# Patient Record
Sex: FEMALE | Race: WHITE | Employment: UNEMPLOYED | URBAN - METROPOLITAN AREA
[De-identification: names, ages, dates, MRNs, and addresses within clinical notes are randomized per-mention and may not be internally consistent; named-entity substitution may affect disease eponyms.]

---

## 2016-06-23 PROBLEM — H40.013: Noted: 2019-05-17

## 2016-06-23 PROBLEM — H52.223: Noted: 2019-05-17

## 2017-05-17 ENCOUNTER — HOSPITAL ENCOUNTER (EMERGENCY)
Age: 42
Discharge: HOME | End: 2017-05-17
Attending: EMERGENCY MEDICINE
Payer: COMMERCIAL

## 2017-05-17 VITALS
BODY MASS INDEX: 36.8 KG/M2 | RESPIRATION RATE: 20 BRPM | DIASTOLIC BLOOD PRESSURE: 102 MMHG | OXYGEN SATURATION: 100 % | WEIGHT: 200 LBS | HEIGHT: 62 IN | SYSTOLIC BLOOD PRESSURE: 157 MMHG | TEMPERATURE: 97.9 F | HEART RATE: 85 BPM

## 2017-05-17 DIAGNOSIS — K08.89 DENTALGIA: ICD-10-CM

## 2017-05-17 DIAGNOSIS — S93.401A SPRAIN OF RIGHT ANKLE, UNSPECIFIED LIGAMENT, INITIAL ENCOUNTER: Primary | ICD-10-CM

## 2017-05-17 PROCEDURE — 99282 EMERGENCY DEPT VISIT SF MDM: CPT

## 2017-05-17 RX ORDER — ACETAMINOPHEN AND CODEINE PHOSPHATE 300; 30 MG/1; MG/1
1 TABLET ORAL
Qty: 15 TAB | Refills: 0 | Status: SHIPPED | OUTPATIENT
Start: 2017-05-17 | End: 2019-02-17

## 2017-05-17 NOTE — DISCHARGE INSTRUCTIONS
Ankle Sprain: Care Instructions  Your Care Instructions    An ankle sprain can happen when you twist your ankle. The ligaments that support the ankle can get stretched and torn. Often the ankle is swollen and painful. Ankle sprains may take from several weeks to several months to heal. Usually, the more pain and swelling you have, the more severe your ankle sprain is and the longer it will take to heal. You can heal faster and regain strength in your ankle with good home treatment. It is very important to give your ankle time to heal completely, so that you do not easily hurt your ankle again. Follow-up care is a key part of your treatment and safety. Be sure to make and go to all appointments, and call your doctor if you are having problems. It's also a good idea to know your test results and keep a list of the medicines you take. How can you care for yourself at home? · Prop up your foot on pillows as much as possible for the next 3 days. Try to keep your ankle above the level of your heart. This will help reduce the swelling. · Follow your doctor's directions for wearing a splint or elastic bandage. Wrapping the ankle may help reduce or prevent swelling. · Your doctor may give you a splint, a brace, an air stirrup, or another form of ankle support to protect your ankle until it is healed. Wear it as directed while your ankle is healing. Do not remove it unless your doctor tells you to. After your ankle has healed, ask your doctor whether you should wear the brace when you exercise. · Put ice or cold packs on your injured ankle for 10 to 20 minutes at a time. Try to do this every 1 to 2 hours for the next 3 days (when you are awake) or until the swelling goes down. Put a thin cloth between the ice and your skin. · You may need to use crutches until you can walk without pain. If you do use crutches, try to bear some weight on your injured ankle if you can do so without pain.  This helps the ankle heal.  · Take pain medicines exactly as directed. ¨ If the doctor gave you a prescription medicine for pain, take it as prescribed. ¨ If you are not taking a prescription pain medicine, ask your doctor if you can take an over-the-counter medicine. · If you have been given ankle exercises to do at home, do them exactly as instructed. These can promote healing and help prevent lasting weakness. When should you call for help? Call your doctor now or seek immediate medical care if:  · Your pain is getting worse. · Your swelling is getting worse. · Your splint feels too tight or you are unable to loosen it. Watch closely for changes in your health, and be sure to contact your doctor if:  · You are not getting better after 1 week. Where can you learn more? Go to http://booker-alessandra.info/. Enter M070 in the search box to learn more about \"Ankle Sprain: Care Instructions. \"  Current as of: May 23, 2016  Content Version: 11.2  © 8438-4129 [employer]. Care instructions adapted under license by [employer] (which disclaims liability or warranty for this information). If you have questions about a medical condition or this instruction, always ask your healthcare professional. [place] [address] any warranty or liability for your use of this information.

## 2017-05-17 NOTE — ED PROVIDER NOTES
HPI Comments: 3:18 PM Jaret Martin is a 38 y.o. female with a history of Hypertension, Hepatic steatosis, Calculus of Kidney, Gallbladder disease, and GERD who presents to ED to be evaluated for R ankle pain and associated swelling onset four days ago. Pt explains her fiance got a new van and she injured her ankle while getting out of it. Pt has had surgery on the same ankle previously. Pt also c/o lower front dental pain. Pt has taken Tylenol with no relief of any pain. 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 No other concerns at this time. The history is provided by the patient. Past Medical History:   Diagnosis Date    Anxiety     Calculus of kidney     Depression     Gallbladder disease     GERD (gastroesophageal reflux disease)     H/O laparoscopic Malick-en-Y gastric bypass, 7/11/12, BMI 52     Hepatic steatosis     Hypertension     Malabsorption 7/20/2012       Past Surgical History:   Procedure Laterality Date    HX CHOLECYSTECTOMY      HX GASTRIC BYPASS  7/11/12    Laparoscopic Malick-en-Y, BMI 52    HX HYSTERECTOMY  2014    HX ORTHOPAEDIC      ANKLE SURGERY    HX OTHER SURGICAL  7/11/12    Laparoscopic Left Hepatic Lobe Wedge Biopsy    HX TONSILLECTOMY      HX TUBAL LIGATION           Family History:   Problem Relation Age of Onset    Diabetes Mother     Obesity Mother     Cancer Mother      LEUKEMIA    Hypertension Mother     Colon Cancer Maternal Grandmother     Cancer Maternal Grandmother     Obesity Sister     Hypertension Sister        Social History     Social History    Marital status:      Spouse name: N/A    Number of children: N/A    Years of education: N/A     Occupational History    Not on file.      Social History Main Topics    Smoking status: Never Smoker    Smokeless tobacco: Never Used    Alcohol use No      Comment: occ    Drug use: No    Sexual activity: Not on file     Other Topics Concern    Not on file     Social History Narrative         ALLERGIES: Nsaids (non-steroidal anti-inflammatory drug) and Tramadol    Review of Systems   Constitutional: Negative for chills and fever. HENT: Positive for dental problem. Negative for congestion, ear pain and sore throat. Eyes: Negative for pain. Respiratory: Negative for cough and shortness of breath. Cardiovascular: Negative for chest pain. Gastrointestinal: Negative for abdominal pain, diarrhea and vomiting. Genitourinary: Negative for difficulty urinating, dysuria and hematuria. Musculoskeletal: Negative for back pain and neck pain. R ankle pain and swelling. Skin: Negative for rash. Neurological: Negative for light-headedness. All other systems reviewed and are negative. Vitals:    05/17/17 1518   BP: (!) 157/102   Pulse: 85   Resp: 20   Temp: 97.9 °F (36.6 °C)   SpO2: 100%   Weight: 90.7 kg (200 lb)   Height: 5' 2\" (1.575 m)            Physical Exam   Constitutional: She appears well-developed and well-nourished. Non-toxic appearance. She does not have a sickly appearance. She does not appear ill. No distress. HENT:   Head: Normocephalic and atraumatic. Mouth/Throat: Oropharynx is clear and moist and mucous membranes are normal. She does not have dentures. No oral lesions. No trismus in the jaw. Abnormal dentition. Dental caries present. No dental abscesses, uvula swelling or lacerations. No oropharyngeal exudate, posterior oropharyngeal edema, posterior oropharyngeal erythema or tonsillar abscesses. 4 teeth lower 23-26    26 mild caries    Eyes: Conjunctivae and EOM are normal. Pupils are equal, round, and reactive to light. No scleral icterus. Neck: Normal range of motion. Neck supple. No hepatojugular reflux and no JVD present. No tracheal deviation present. No thyromegaly present.    Cardiovascular: Normal rate, regular rhythm, S1 normal, S2 normal, normal heart sounds, intact distal pulses and normal pulses. Exam reveals no gallop, no S3 and no S4. No murmur heard. Pulses:       Radial pulses are 2+ on the right side, and 2+ on the left side. Dorsalis pedis pulses are 2+ on the right side, and 2+ on the left side. Pulmonary/Chest: Effort normal and breath sounds normal. No respiratory distress. She has no decreased breath sounds. She has no wheezes. She has no rhonchi. She has no rales. Abdominal: Soft. Normal appearance and bowel sounds are normal. She exhibits no distension and no mass. There is no hepatosplenomegaly. There is no tenderness. There is no rigidity, no rebound, no guarding, no CVA tenderness, no tenderness at McBurney's point and negative Ho's sign. Musculoskeletal: Normal range of motion. Lymphadenopathy:        Head (right side): No submental, no submandibular, no preauricular and no occipital adenopathy present. Head (left side): No submental, no submandibular, no preauricular and no occipital adenopathy present. She has no cervical adenopathy. Right: No supraclavicular adenopathy present. Left: No supraclavicular adenopathy present. Neurological: She is alert. She has normal strength and normal reflexes. She is not disoriented. No cranial nerve deficit or sensory deficit. Coordination and gait normal. GCS eye subscore is 4. GCS verbal subscore is 5. GCS motor subscore is 6. Grossly intact    Skin: Skin is warm, dry and intact. No rash noted. She is not diaphoretic. Psychiatric: She has a normal mood and affect. Her speech is normal and behavior is normal. Judgment and thought content normal. Cognition and memory are normal.   Nursing note and vitals reviewed. MDM  Number of Diagnoses or Management Options  Dentalgia:   Sprain of right ankle, unspecified ligament, initial encounter:     ED Course       Procedures    I have assessed the patient. Patient is feeling better.   Patient will be prescribed Tylenol 3.  Patient was discharged in stable condition. Patient is to return to emergency department if any new or worsening condition. 3:24 PM    Disposition: Discharged    Diagnosis:   1. Sprain of right ankle, unspecified ligament, initial encounter    2. Dentalgia          Follow-up Information     Follow up With Details Comments MD Lui In 2 days Follow up [id number] [address], [facility] Jaxon, am scribing for, and in the presence of [facility],  found 3:16 PM, 05/17/17. Physician Attestation  I personally performed the services described in the documentation, reviewed the documentation, as recorded by the scribe in my presence, and it accurately and completely records my words and actions.     [facility],   Signed by: Brooke Cain, May 17, 2017 at 3:17 PM

## 2017-07-22 ENCOUNTER — APPOINTMENT (OUTPATIENT)
Dept: CT IMAGING | Age: 42
End: 2017-07-22
Attending: PHYSICIAN ASSISTANT
Payer: MEDICAID

## 2017-07-22 ENCOUNTER — HOSPITAL ENCOUNTER (EMERGENCY)
Age: 42
Discharge: HOME | End: 2017-07-22
Attending: EMERGENCY MEDICINE
Payer: MEDICAID

## 2017-07-22 VITALS
OXYGEN SATURATION: 99 % | HEIGHT: 62 IN | DIASTOLIC BLOOD PRESSURE: 83 MMHG | HEART RATE: 68 BPM | WEIGHT: 200 LBS | RESPIRATION RATE: 17 BRPM | TEMPERATURE: 98.3 F | BODY MASS INDEX: 36.8 KG/M2 | SYSTOLIC BLOOD PRESSURE: 141 MMHG

## 2017-07-22 DIAGNOSIS — R10.84 ABDOMINAL PAIN, GENERALIZED: Primary | ICD-10-CM

## 2017-07-22 LAB
ALBUMIN SERPL BCP-MCNC: 3.9 G/DL (ref 3.4–5)
ALBUMIN/GLOB SERPL: 1.1 {RATIO} (ref 0.8–1.7)
ALP SERPL-CCNC: 89 U/L (ref 45–117)
ALT SERPL-CCNC: 18 U/L (ref 13–56)
ANION GAP BLD CALC-SCNC: 6 MMOL/L (ref 3–18)
APPEARANCE UR: CLEAR
AST SERPL W P-5'-P-CCNC: 12 U/L (ref 15–37)
BASOPHILS # BLD AUTO: 0 K/UL (ref 0–0.06)
BASOPHILS # BLD: 0 % (ref 0–2)
BILIRUB DIRECT SERPL-MCNC: 0.1 MG/DL (ref 0–0.2)
BILIRUB SERPL-MCNC: 0.4 MG/DL (ref 0.2–1)
BILIRUB UR QL: NEGATIVE
BUN SERPL-MCNC: 8 MG/DL (ref 7–18)
BUN/CREAT SERPL: 9 (ref 12–20)
CALCIUM SERPL-MCNC: 9 MG/DL (ref 8.5–10.1)
CHLORIDE SERPL-SCNC: 110 MMOL/L (ref 100–108)
CO2 SERPL-SCNC: 26 MMOL/L (ref 21–32)
COLOR UR: YELLOW
CREAT SERPL-MCNC: 0.87 MG/DL (ref 0.6–1.3)
DIFFERENTIAL METHOD BLD: ABNORMAL
EOSINOPHIL # BLD: 0 K/UL (ref 0–0.4)
EOSINOPHIL NFR BLD: 1 % (ref 0–5)
ERYTHROCYTE [DISTWIDTH] IN BLOOD BY AUTOMATED COUNT: 13.6 % (ref 11.6–14.5)
GLOBULIN SER CALC-MCNC: 3.4 G/DL (ref 2–4)
GLUCOSE SERPL-MCNC: 98 MG/DL (ref 74–99)
GLUCOSE UR STRIP.AUTO-MCNC: NEGATIVE MG/DL
HCT VFR BLD AUTO: 44.8 % (ref 35–45)
HGB BLD-MCNC: 14.8 G/DL (ref 12–16)
HGB UR QL STRIP: NEGATIVE
KETONES UR QL STRIP.AUTO: ABNORMAL MG/DL
LEUKOCYTE ESTERASE UR QL STRIP.AUTO: NEGATIVE
LIPASE SERPL-CCNC: 166 U/L (ref 73–393)
LYMPHOCYTES # BLD AUTO: 19 % (ref 21–52)
LYMPHOCYTES # BLD: 1.2 K/UL (ref 0.9–3.6)
MCH RBC QN AUTO: 31.8 PG (ref 24–34)
MCHC RBC AUTO-ENTMCNC: 33 G/DL (ref 31–37)
MCV RBC AUTO: 96.1 FL (ref 74–97)
MONOCYTES # BLD: 0.4 K/UL (ref 0.05–1.2)
MONOCYTES NFR BLD AUTO: 5 % (ref 3–10)
NEUTS SEG # BLD: 4.8 K/UL (ref 1.8–8)
NEUTS SEG NFR BLD AUTO: 75 % (ref 40–73)
NITRITE UR QL STRIP.AUTO: NEGATIVE
PH UR STRIP: >8.5 [PH] (ref 5–8)
PLATELET # BLD AUTO: 239 K/UL (ref 135–420)
PMV BLD AUTO: 11.1 FL (ref 9.2–11.8)
POTASSIUM SERPL-SCNC: 3.6 MMOL/L (ref 3.5–5.5)
PROT SERPL-MCNC: 7.3 G/DL (ref 6.4–8.2)
PROT UR STRIP-MCNC: NEGATIVE MG/DL
RBC # BLD AUTO: 4.66 M/UL (ref 4.2–5.3)
SODIUM SERPL-SCNC: 142 MMOL/L (ref 136–145)
SP GR UR REFRACTOMETRY: 1.02 (ref 1–1.03)
UROBILINOGEN UR QL STRIP.AUTO: 1 EU/DL (ref 0.2–1)
WBC # BLD AUTO: 6.5 K/UL (ref 4.6–13.2)

## 2017-07-22 PROCEDURE — 80048 BASIC METABOLIC PNL TOTAL CA: CPT | Performed by: PHYSICIAN ASSISTANT

## 2017-07-22 PROCEDURE — 81003 URINALYSIS AUTO W/O SCOPE: CPT | Performed by: PHYSICIAN ASSISTANT

## 2017-07-22 PROCEDURE — 85025 COMPLETE CBC W/AUTO DIFF WBC: CPT | Performed by: PHYSICIAN ASSISTANT

## 2017-07-22 PROCEDURE — 80076 HEPATIC FUNCTION PANEL: CPT | Performed by: PHYSICIAN ASSISTANT

## 2017-07-22 PROCEDURE — 83690 ASSAY OF LIPASE: CPT | Performed by: PHYSICIAN ASSISTANT

## 2017-07-22 PROCEDURE — 74011250636 HC RX REV CODE- 250/636: Performed by: PHYSICIAN ASSISTANT

## 2017-07-22 PROCEDURE — 74177 CT ABD & PELVIS W/CONTRAST: CPT

## 2017-07-22 PROCEDURE — 74011636320 HC RX REV CODE- 636/320: Performed by: EMERGENCY MEDICINE

## 2017-07-22 PROCEDURE — 99283 EMERGENCY DEPT VISIT LOW MDM: CPT

## 2017-07-22 PROCEDURE — 96376 TX/PRO/DX INJ SAME DRUG ADON: CPT

## 2017-07-22 PROCEDURE — 96374 THER/PROPH/DIAG INJ IV PUSH: CPT

## 2017-07-22 RX ORDER — HYDROMORPHONE HYDROCHLORIDE 1 MG/ML
0.5 INJECTION, SOLUTION INTRAMUSCULAR; INTRAVENOUS; SUBCUTANEOUS
Status: COMPLETED | OUTPATIENT
Start: 2017-07-22 | End: 2017-07-22

## 2017-07-22 RX ORDER — OXYCODONE AND ACETAMINOPHEN 5; 325 MG/1; MG/1
1 TABLET ORAL
Qty: 10 TAB | Refills: 0 | Status: SHIPPED | OUTPATIENT
Start: 2017-07-22 | End: 2018-02-14

## 2017-07-22 RX ORDER — HYDROMORPHONE HYDROCHLORIDE 1 MG/ML
1 INJECTION, SOLUTION INTRAMUSCULAR; INTRAVENOUS; SUBCUTANEOUS ONCE
Status: COMPLETED | OUTPATIENT
Start: 2017-07-22 | End: 2017-07-22

## 2017-07-22 RX ORDER — ONDANSETRON 4 MG/1
8 TABLET, FILM COATED ORAL
Qty: 12 TAB | Refills: 0 | Status: SHIPPED | OUTPATIENT
Start: 2017-07-22

## 2017-07-22 RX ADMIN — HYDROMORPHONE HYDROCHLORIDE 1 MG: 1 INJECTION, SOLUTION INTRAMUSCULAR; INTRAVENOUS; SUBCUTANEOUS at 13:20

## 2017-07-22 RX ADMIN — HYDROMORPHONE HYDROCHLORIDE 0.5 MG: 1 INJECTION, SOLUTION INTRAMUSCULAR; INTRAVENOUS; SUBCUTANEOUS at 14:54

## 2017-07-22 RX ADMIN — IOPAMIDOL 100 ML: 612 INJECTION, SOLUTION INTRAVENOUS at 14:30

## 2017-07-22 NOTE — ED NOTES
Discussed with the patient and all questioned fully answered. She will call me if any problems arise. Discharge medications reviewed with patient and appropriate educational materials and side effects teaching were provided. IV removed. Pt advised to return if symptoms get worse.

## 2017-07-22 NOTE — ED TRIAGE NOTES
Patient C/O right lower quadrant abdominal pain and nausea starting this morning, denies chest pain and shortness of breath.

## 2017-07-22 NOTE — DISCHARGE INSTRUCTIONS

## 2017-07-22 NOTE — ED NOTES
Discussed plan of care with patient at this time. Questions encouraged and addressed. Call light within reach.

## 2017-07-22 NOTE — ED NOTES
Patient resting comfortably on stretcher. Denies need for anything at this time. Call light within reach.

## 2017-07-22 NOTE — ED PROVIDER NOTES
HPI Comments: 37yo female presenting to ED with abdominal pain x1 day. Pt reports pain in lower abdomen, R>L but radiating to generalized abdomen region. Pt c/o nausea with pain, denies vomiting, fever, chills, urinary symptoms, CP, SOB, vaginal complaints, back pain o any other symptos. Past Medical History:  No date: Anxiety  No date: Calculus of kidney  No date: Depression  No date: Gallbladder disease  No date: GERD (gastroesophageal reflux disease)  No date: H/O laparoscopic Malick-en-Y gastric bypass, 7/1*  No date: Hepatic steatosis  No date: Hypertension  7/20/2012: Malabsorption   Past Surgical History:  No date: HX CHOLECYSTECTOMY  7/11/12: HX GASTRIC BYPASS      Comment: Laparoscopic Malick-en-Y, BMI 47  2014: HX HYSTERECTOMY  No date: HX ORTHOPAEDIC      Comment: ANKLE SURGERY  7/11/12: HX OTHER SURGICAL      Comment: Laparoscopic Left Hepatic Lobe Wedge Biopsy  No date: HX TONSILLECTOMY  No date: HX TUBAL LIGATION      Patient is a 38 y.o. female presenting with abdominal pain and nausea. The history is provided by the patient. Abdominal Pain    This is a new problem. Episode onset: woke up with pain. The problem occurs constantly. The problem has not changed since onset. The pain is located in the RLQ. The quality of the pain is sharp and throbbing. Associated symptoms include nausea. Nothing worsens the pain. The pain is relieved by nothing. Nausea    Associated symptoms include abdominal pain.         Past Medical History:   Diagnosis Date    Anxiety     Calculus of kidney     Depression     Gallbladder disease     GERD (gastroesophageal reflux disease)     H/O laparoscopic Malick-en-Y gastric bypass, 7/11/12, BMI 47     Hepatic steatosis     Hypertension     Malabsorption 7/20/2012       Past Surgical History:   Procedure Laterality Date    HX CHOLECYSTECTOMY      HX GASTRIC BYPASS  7/11/12    Laparoscopic Malick-en-Y, BMI 47    HX HYSTERECTOMY  2014    HX ORTHOPAEDIC      ANKLE SURGERY  HX OTHER SURGICAL  7/11/12    Laparoscopic Left Hepatic Lobe Wedge Biopsy    HX TONSILLECTOMY      HX TUBAL LIGATION           Family History:   Problem Relation Age of Onset    Diabetes Mother     Obesity Mother     Cancer Mother      LEUKEMIA    Hypertension Mother     Colon Cancer Maternal Grandmother     Cancer Maternal Grandmother     Obesity Sister     Hypertension Sister        Social History     Social History    Marital status:      Spouse name: N/A    Number of children: N/A    Years of education: N/A     Occupational History    Not on file. Social History Main Topics    Smoking status: Never Smoker    Smokeless tobacco: Never Used    Alcohol use No      Comment: occ    Drug use: No    Sexual activity: Not on file     Other Topics Concern    Not on file     Social History Narrative         ALLERGIES: Nsaids (non-steroidal anti-inflammatory drug) and Tramadol    Review of Systems   Gastrointestinal: Positive for abdominal pain and nausea. Vitals:    07/22/17 1210   BP: (!) 148/98   Pulse: 90   Resp: 16   Temp: 98.3 °F (36.8 °C)   SpO2: 100%   Weight: 90.7 kg (200 lb)   Height: 5' 2\" (1.575 m)            Physical Exam   Constitutional: She is oriented to person, place, and time. She appears well-developed and well-nourished. No distress. HENT:   Head: Normocephalic and atraumatic. Mouth/Throat: Oropharynx is clear and moist.   Eyes: Conjunctivae are normal.   Neck: Normal range of motion. Neck supple. Cardiovascular: Normal rate, regular rhythm and normal heart sounds. Pulmonary/Chest: Effort normal and breath sounds normal. No respiratory distress. She has no wheezes. She exhibits no tenderness. Abdominal: Soft. Bowel sounds are normal. She exhibits no distension and no mass. There is tenderness. There is no rebound and no guarding. Musculoskeletal: Normal range of motion. Neurological: She is alert and oriented to person, place, and time.  No cranial nerve deficit. Coordination normal.   Skin: Skin is warm and dry. No rash noted. She is not diaphoretic. Psychiatric: She has a normal mood and affect. Her behavior is normal.   Nursing note and vitals reviewed. MDM  Number of Diagnoses or Management Options  Diagnosis management comments: Pt presents ambulatory in NAD, well-hydrated, non-toxic in appearance, with mildly elevated HR and otherwise normal vitals. Neg CP, fever, chills, sob, vaginal or urinary symptoms. Benign exam of abdomen with minimal epigastric and RLQ TTP and no peritoneal signs. Pt is deferring pelvic exam.  Unremarkable labs. Hx of gastric bypass. CT abd wcontract unremarkable. No appendicitis, diverticulitis, obstruction. Tolerating PO. No vomiting inED. Abdomen reassessed and soft. No pain at present. Pt appears well, comfortable. D/w patient- she wants to go home and will f/u with PCP and/or return immediately with sx discussed including fever, vomiting, etc.   Will DC home with pain meds and zofran.            Amount and/or Complexity of Data Reviewed  Clinical lab tests: ordered and reviewed  Tests in the radiology section of CPT®: ordered and reviewed      ED Course       Procedures

## 2017-10-23 ENCOUNTER — APPOINTMENT (OUTPATIENT)
Dept: GENERAL RADIOLOGY | Age: 42
End: 2017-10-23
Attending: EMERGENCY MEDICINE
Payer: MEDICAID

## 2017-10-23 ENCOUNTER — HOSPITAL ENCOUNTER (EMERGENCY)
Age: 42
Discharge: HOME | End: 2017-10-23
Attending: EMERGENCY MEDICINE | Admitting: EMERGENCY MEDICINE
Payer: MEDICAID

## 2017-10-23 VITALS
WEIGHT: 200 LBS | RESPIRATION RATE: 20 BRPM | OXYGEN SATURATION: 99 % | BODY MASS INDEX: 36.8 KG/M2 | SYSTOLIC BLOOD PRESSURE: 127 MMHG | HEART RATE: 87 BPM | DIASTOLIC BLOOD PRESSURE: 94 MMHG | HEIGHT: 62 IN | TEMPERATURE: 98.5 F

## 2017-10-23 DIAGNOSIS — S92.405A CLOSED NONDISPLACED FRACTURE OF PHALANX OF LEFT GREAT TOE, UNSPECIFIED PHALANX, INITIAL ENCOUNTER: Primary | ICD-10-CM

## 2017-10-23 PROCEDURE — 99283 EMERGENCY DEPT VISIT LOW MDM: CPT

## 2017-10-23 PROCEDURE — 73562 X-RAY EXAM OF KNEE 3: CPT

## 2017-10-23 PROCEDURE — 73564 X-RAY EXAM KNEE 4 OR MORE: CPT

## 2017-10-23 PROCEDURE — 73660 X-RAY EXAM OF TOE(S): CPT

## 2017-10-23 PROCEDURE — 73600 X-RAY EXAM OF ANKLE: CPT

## 2017-10-23 PROCEDURE — 74011250637 HC RX REV CODE- 250/637: Performed by: EMERGENCY MEDICINE

## 2017-10-23 PROCEDURE — L1902 AFO ANKLE GAUNTLET PRE OTS: HCPCS

## 2017-10-23 RX ORDER — HYDROCODONE BITARTRATE AND ACETAMINOPHEN 5; 325 MG/1; MG/1
TABLET ORAL
Qty: 12 TAB | Refills: 0 | Status: SHIPPED | OUTPATIENT
Start: 2017-10-23 | End: 2018-05-03

## 2017-10-23 RX ORDER — HYDROCODONE BITARTRATE AND ACETAMINOPHEN 5; 325 MG/1; MG/1
1 TABLET ORAL
Status: COMPLETED | OUTPATIENT
Start: 2017-10-23 | End: 2017-10-23

## 2017-10-23 RX ADMIN — HYDROCODONE BITARTRATE AND ACETAMINOPHEN 1 TABLET: 5; 325 TABLET ORAL at 11:54

## 2017-10-23 NOTE — ED PROVIDER NOTES
HPI Comments: 10:40 AM Diane Bloom is a 38 y.o. female with h/o HTN who presents to ED complaining of constant right ankle onset yesterday. Pt states was steam cleaning a floor and slipped when she moved from carpet to linoleum and twisted her right ankle and \"jammed\" her left great toe. She also has pain in her left knee, left hip, and left arm. Pt can ambulate but states it is painful to do so. Pt states she took 6 Tylenol yesterday with no relief and 3 Tylenol this morning with no relief. Pt admits to having arthritic issues with left knee. Past surgical hx includes gastric bypass surgery (states not had HTN since the surgery) and fracturing right ankle twice. Pt states she is unable to take NSAIDs Pt denies DM, tobacco use, ETOH use, drug use. Denies head injury. No other concerns or symptoms at this time. PCP: Wei aSnz MD      The history is provided by the patient.         Past Medical History:   Diagnosis Date    Anxiety     Calculus of kidney     Depression     Gallbladder disease     GERD (gastroesophageal reflux disease)     H/O laparoscopic Malick-en-Y gastric bypass, 7/11/12, BMI 52     Hepatic steatosis     Hypertension     Malabsorption 7/20/2012       Past Surgical History:   Procedure Laterality Date    HX CHOLECYSTECTOMY      HX GASTRIC BYPASS  7/11/12    Laparoscopic Malick-en-Y, BMI 52    HX HYSTERECTOMY  2014    HX ORTHOPAEDIC      ANKLE SURGERY    HX OTHER SURGICAL  7/11/12    Laparoscopic Left Hepatic Lobe Wedge Biopsy    HX TONSILLECTOMY      HX TUBAL LIGATION           Family History:   Problem Relation Age of Onset    Diabetes Mother     Obesity Mother     Cancer Mother      LEUKEMIA    Hypertension Mother     Colon Cancer Maternal Grandmother     Cancer Maternal Grandmother     Obesity Sister     Hypertension Sister        Social History     Social History    Marital status:      Spouse name: N/A    Number of children: N/A    Years of education: N/A     Occupational History    Not on file. Social History Main Topics    Smoking status: Never Smoker    Smokeless tobacco: Never Used    Alcohol use No      Comment: occ    Drug use: No    Sexual activity: Not on file     Other Topics Concern    Not on file     Social History Narrative         ALLERGIES: Nsaids (non-steroidal anti-inflammatory drug) and Tramadol    Review of Systems   Constitutional: Negative. Negative for chills, diaphoresis and fever. HENT: Negative. Negative for congestion, rhinorrhea and sore throat. Eyes: Negative. Negative for pain, discharge and redness. Respiratory: Negative. Negative for cough, chest tightness, shortness of breath and wheezing. Cardiovascular: Negative. Negative for chest pain. Gastrointestinal: Negative. Negative for abdominal pain, constipation, diarrhea, nausea and vomiting. Genitourinary: Negative. Negative for dysuria, flank pain, frequency, hematuria and urgency. Musculoskeletal: Negative. Negative for back pain and neck pain. Positive right ankle pain  Positive left great toe pain  Positive left knee pain  Positive left hip pain    Skin: Negative. Negative for rash. Neurological: Negative. Negative for syncope, weakness, numbness and headaches. Psychiatric/Behavioral: Negative. All other systems reviewed and are negative. Vitals:    10/23/17 1030   BP: (!) 127/94   Pulse: 87   Resp: 20   Temp: 98.5 °F (36.9 °C)   SpO2: 99%   Weight: 90.7 kg (200 lb)   Height: 5' 2\" (1.575 m)            Physical Exam   Constitutional: She appears well-developed and well-nourished. Non-toxic appearance. She does not have a sickly appearance. She does not appear ill. No distress. HENT:   Head: Normocephalic and atraumatic. Mouth/Throat: Oropharynx is clear and moist. No oropharyngeal exudate. Eyes: Conjunctivae and EOM are normal. Pupils are equal, round, and reactive to light. No scleral icterus.    Neck: Trachea normal and normal range of motion. Neck supple. No hepatojugular reflux and no JVD present. No tracheal deviation present. No thyromegaly present. Cardiovascular: Normal rate, regular rhythm, S1 normal, S2 normal, normal heart sounds, intact distal pulses and normal pulses. Exam reveals no gallop, no S3 and no S4. No murmur heard. Pulses:       Radial pulses are 2+ on the right side, and 2+ on the left side. Dorsalis pedis pulses are 2+ on the right side, and 2+ on the left side. Pulmonary/Chest: Effort normal and breath sounds normal. No accessory muscle usage. No respiratory distress. She has no decreased breath sounds. She has no wheezes. She has no rhonchi. She has no rales. Abdominal: Soft. Normal appearance and bowel sounds are normal. She exhibits no distension and no mass. There is no hepatosplenomegaly. There is no tenderness. There is no rigidity, no rebound, no guarding, no CVA tenderness, no tenderness at McBurney's point and negative Ho's sign. Musculoskeletal: Normal range of motion. Left knee: She exhibits normal range of motion, no swelling, no effusion, no ecchymosis, no deformity, no laceration, no erythema, normal alignment, no LCL laxity, normal patellar mobility, no bony tenderness, normal meniscus and no MCL laxity. Tenderness found. No patellar tendon tenderness noted. Right ankle: She exhibits normal range of motion, no swelling, no ecchymosis, no deformity, no laceration and normal pulse. Tenderness. Lateral malleolus tenderness found. No medial malleolus, no AITFL, no CF ligament, no posterior TFL, no head of 5th metatarsal and no proximal fibula tenderness found. Achilles tendon normal.        Legs:       Feet:    Lymphadenopathy:        Head (right side): No submental, no submandibular, no preauricular and no occipital adenopathy present.         Head (left side): No submental, no submandibular, no preauricular and no occipital adenopathy present. She has no cervical adenopathy. Right: No supraclavicular adenopathy present. Left: No supraclavicular adenopathy present. Neurological: She is alert. She has normal strength and normal reflexes. She is not disoriented. No cranial nerve deficit or sensory deficit. Coordination and gait normal. GCS eye subscore is 4. GCS verbal subscore is 5. GCS motor subscore is 6. Grossly intact    Skin: Skin is warm, dry and intact. No rash noted. She is not diaphoretic. Psychiatric: She has a normal mood and affect. Her speech is normal and behavior is normal. Judgment and thought content normal. Cognition and memory are normal.   Nursing note and vitals reviewed. MDM  Number of Diagnoses or Management Options  Closed nondisplaced fracture of phalanx of left great toe, unspecified phalanx, initial encounter:   Diagnosis management comments: Fall  Contusion   Fracture     ED Course       Procedures    Left great toe X-Ray showed fracture. Left knee X-Ray showed No acute process. Right ankle X-Ray showed No acute process   11:26 AM 10/23/2017     I have reassessed the patient. Patient is feeling better. Patient will be prescribed Norco.  Patient was discharged in stable condition. Patient is to return to emergency department if any new or worsening condition. Scribe Attestation      Jenni Red and Jammin Java acting as a scribe for and in the presence of Nubia Ray DO      October 23, 2017 at 11:00 AM       Provider Attestation:      I personally performed the services described in the documentation, reviewed the documentation, as recorded by the scribe in my presence, and it accurately and completely records my words and actions.  October 23, 2017 at 11:00 AM - Nubia Ray DO

## 2017-10-23 NOTE — DISCHARGE INSTRUCTIONS
Broken Toe: Care Instructions  Your Care Instructions  You have broken (fractured) a bone in your toe. This kind of fracture does not need a special cast or brace. \"Tariq-taping\" your broken toe to a healthy toe next to it is almost always enough to treat the problem and ease symptoms. The toe may take 4 weeks or more to heal.  You heal best when you take good care of yourself. Eat a variety of healthy foods, and don't smoke. Follow-up care is a key part of your treatment and safety. Be sure to make and go to all appointments, and call your doctor if you are having problems. It's also a good idea to know your test results and keep a list of the medicines you take. How can you care for yourself at home? · Be safe with medicines. Take pain medicines exactly as directed. ¨ If the doctor gave you a prescription medicine for pain, take it as prescribed. ¨ If you are not taking a prescription pain medicine, ask your doctor if you can take an over-the-counter medicine. · If your toe is taped to the toe next to it, your doctor has shown you how to change the tape. Protect the skin by putting something soft, such as felt or foam, between your toes before you tape them together. Never tape the toes together skin-to-skin. Your broken toe may need to be tariq-taped for 2 to 4 weeks to heal.  · Rest and protect your toe. Do not walk on it until you can do so without too much pain. If the doctor has told you to use crutches, use them as instructed. · Put ice or a cold pack on your toe for 10 to 20 minutes at a time. Try to do this every 1 to 2 hours for the next 3 days (when you are awake) or until the swelling goes down. Put a thin cloth between the ice and your skin. · Prop up your foot on a pillow when you ice it or anytime you sit or lie down. Try to keep it above the level of your heart. This will help reduce swelling. · Make sure you go to your follow-up appointments.  Your doctor will need to check that your toe is healing right. When should you call for help? Call your doctor now or seek immediate medical care if:  · You have severe pain. · Your toe is cool or pale or changes color. · You have tingling, weakness, or numbness in your toe. Watch closely for changes in your health, and be sure to contact your doctor if:  · Pain and swelling get worse or are not improving. · You have a new or worse deformity in your toe. Where can you learn more? Go to http://booker-alessandra.info/. Enter B514 in the search box to learn more about \"Broken Toe: Care Instructions. \"  Current as of: October 19, 2016  Content Version: 11.3  © 4412-0632 [employer]. Care instructions adapted under license by [employer] (which disclaims liability or warranty for this information). If you have questions about a medical condition or this instruction, always ask your healthcare professional. [address] any warranty or liability for your use of this information.

## 2018-02-14 ENCOUNTER — HOSPITAL ENCOUNTER (EMERGENCY)
Age: 43
Discharge: HOME | End: 2018-02-14
Attending: EMERGENCY MEDICINE
Payer: MEDICAID

## 2018-02-14 ENCOUNTER — APPOINTMENT (OUTPATIENT)
Dept: CT IMAGING | Age: 43
End: 2018-02-14
Attending: NURSE PRACTITIONER
Payer: MEDICAID

## 2018-02-14 VITALS
TEMPERATURE: 97.8 F | DIASTOLIC BLOOD PRESSURE: 99 MMHG | SYSTOLIC BLOOD PRESSURE: 144 MMHG | HEIGHT: 62 IN | HEART RATE: 81 BPM | WEIGHT: 205 LBS | OXYGEN SATURATION: 97 % | BODY MASS INDEX: 37.73 KG/M2 | RESPIRATION RATE: 16 BRPM

## 2018-02-14 DIAGNOSIS — R10.31 ABDOMINAL PAIN, RIGHT LOWER QUADRANT: Primary | ICD-10-CM

## 2018-02-14 LAB
ALBUMIN SERPL-MCNC: 3.8 G/DL (ref 3.4–5)
ALBUMIN/GLOB SERPL: 1.1 {RATIO} (ref 0.8–1.7)
ALP SERPL-CCNC: 118 U/L (ref 45–117)
ALT SERPL-CCNC: 22 U/L (ref 13–56)
ANION GAP SERPL CALC-SCNC: 8 MMOL/L (ref 3–18)
APPEARANCE UR: CLEAR
AST SERPL-CCNC: 18 U/L (ref 15–37)
BACTERIA URNS QL MICRO: NEGATIVE /HPF
BASOPHILS # BLD: 0 K/UL (ref 0–0.06)
BASOPHILS NFR BLD: 1 % (ref 0–2)
BILIRUB DIRECT SERPL-MCNC: 0.2 MG/DL (ref 0–0.2)
BILIRUB SERPL-MCNC: 0.4 MG/DL (ref 0.2–1)
BILIRUB UR QL: NEGATIVE
BUN SERPL-MCNC: 10 MG/DL (ref 7–18)
BUN/CREAT SERPL: 11 (ref 12–20)
CALCIUM SERPL-MCNC: 9.1 MG/DL (ref 8.5–10.1)
CHLORIDE SERPL-SCNC: 101 MMOL/L (ref 100–108)
CO2 SERPL-SCNC: 30 MMOL/L (ref 21–32)
COLOR UR: YELLOW
CREAT SERPL-MCNC: 0.88 MG/DL (ref 0.6–1.3)
DIFFERENTIAL METHOD BLD: ABNORMAL
EOSINOPHIL # BLD: 0.1 K/UL (ref 0–0.4)
EOSINOPHIL NFR BLD: 2 % (ref 0–5)
EPITH CASTS URNS QL MICRO: NORMAL /LPF (ref 0–5)
ERYTHROCYTE [DISTWIDTH] IN BLOOD BY AUTOMATED COUNT: 12.2 % (ref 11.6–14.5)
GLOBULIN SER CALC-MCNC: 3.4 G/DL (ref 2–4)
GLUCOSE SERPL-MCNC: 97 MG/DL (ref 74–99)
GLUCOSE UR STRIP.AUTO-MCNC: NEGATIVE MG/DL
HCT VFR BLD AUTO: 45.3 % (ref 35–45)
HGB BLD-MCNC: 15.2 G/DL (ref 12–16)
HGB UR QL STRIP: ABNORMAL
KETONES UR QL STRIP.AUTO: ABNORMAL MG/DL
LEUKOCYTE ESTERASE UR QL STRIP.AUTO: NEGATIVE
LYMPHOCYTES # BLD: 2 K/UL (ref 0.9–3.6)
LYMPHOCYTES NFR BLD: 35 % (ref 21–52)
MCH RBC QN AUTO: 32.1 PG (ref 24–34)
MCHC RBC AUTO-ENTMCNC: 33.6 G/DL (ref 31–37)
MCV RBC AUTO: 95.8 FL (ref 74–97)
MONOCYTES # BLD: 0.4 K/UL (ref 0.05–1.2)
MONOCYTES NFR BLD: 6 % (ref 3–10)
NEUTS SEG # BLD: 3.2 K/UL (ref 1.8–8)
NEUTS SEG NFR BLD: 56 % (ref 40–73)
NITRITE UR QL STRIP.AUTO: NEGATIVE
PH UR STRIP: 5 [PH] (ref 5–8)
PLATELET # BLD AUTO: 239 K/UL (ref 135–420)
PMV BLD AUTO: 10.8 FL (ref 9.2–11.8)
POTASSIUM SERPL-SCNC: 3.9 MMOL/L (ref 3.5–5.5)
PROT SERPL-MCNC: 7.2 G/DL (ref 6.4–8.2)
PROT UR STRIP-MCNC: NEGATIVE MG/DL
RBC # BLD AUTO: 4.73 M/UL (ref 4.2–5.3)
RBC #/AREA URNS HPF: NORMAL /HPF (ref 0–5)
SODIUM SERPL-SCNC: 139 MMOL/L (ref 136–145)
SP GR UR REFRACTOMETRY: 1.02 (ref 1–1.03)
UROBILINOGEN UR QL STRIP.AUTO: 0.2 EU/DL (ref 0.2–1)
WBC # BLD AUTO: 5.8 K/UL (ref 4.6–13.2)
WBC URNS QL MICRO: NORMAL /HPF (ref 0–4)

## 2018-02-14 PROCEDURE — 99283 EMERGENCY DEPT VISIT LOW MDM: CPT

## 2018-02-14 PROCEDURE — 74011250636 HC RX REV CODE- 250/636: Performed by: EMERGENCY MEDICINE

## 2018-02-14 PROCEDURE — 96374 THER/PROPH/DIAG INJ IV PUSH: CPT

## 2018-02-14 PROCEDURE — 96376 TX/PRO/DX INJ SAME DRUG ADON: CPT

## 2018-02-14 PROCEDURE — 85025 COMPLETE CBC W/AUTO DIFF WBC: CPT | Performed by: NURSE PRACTITIONER

## 2018-02-14 PROCEDURE — 80048 BASIC METABOLIC PNL TOTAL CA: CPT | Performed by: NURSE PRACTITIONER

## 2018-02-14 PROCEDURE — 74176 CT ABD & PELVIS W/O CONTRAST: CPT

## 2018-02-14 PROCEDURE — 81001 URINALYSIS AUTO W/SCOPE: CPT | Performed by: NURSE PRACTITIONER

## 2018-02-14 PROCEDURE — 80076 HEPATIC FUNCTION PANEL: CPT | Performed by: NURSE PRACTITIONER

## 2018-02-14 RX ORDER — MORPHINE SULFATE 2 MG/ML
4 INJECTION, SOLUTION INTRAMUSCULAR; INTRAVENOUS ONCE
Status: COMPLETED | OUTPATIENT
Start: 2018-02-14 | End: 2018-02-14

## 2018-02-14 RX ORDER — OXYCODONE AND ACETAMINOPHEN 5; 325 MG/1; MG/1
TABLET ORAL
Qty: 12 TAB | Refills: 0 | Status: SHIPPED | OUTPATIENT
Start: 2018-02-14 | End: 2018-05-03

## 2018-02-14 RX ADMIN — MORPHINE SULFATE 4 MG: 2 INJECTION, SOLUTION INTRAMUSCULAR; INTRAVENOUS at 20:00

## 2018-02-14 RX ADMIN — MORPHINE SULFATE 4 MG: 2 INJECTION, SOLUTION INTRAMUSCULAR; INTRAVENOUS at 18:28

## 2018-02-14 NOTE — ED TRIAGE NOTES
LRQ abdominal pain, onset several days, some nausea, states she has been urinating more often than normal

## 2018-02-14 NOTE — ED PROVIDER NOTES
EMERGENCY DEPARTMENT HISTORY AND PHYSICAL EXAM    6:50 PM      Date: 2/14/2018  Patient Name: Josette Razo    History of Presenting Illness     Chief Complaint   Patient presents with    Abdominal Pain    Nausea         History Provided By: Patient     Chief Complaint: Abd pain  Duration: Days  Timing: Constant  Location: RLQ  Quality: achy  Severity: moderate   Modifying Factors: pain exacerbated with deep inspiration and ambulation   Associated Symptoms: no associated sx    Additional History (Context): Josette Razo is a 38 y.o. female presents with a h/o HTN, gastric bypass, cholecystectomy and hysterectomy who presents to the ED complaining of constant achy RLQ abd pain that began 4 days ago. The patient states that her pain is exacerbated with deep inspiration and ambulation. She notes that her gastric bypass was 6 years ago. No other complaints or concerns at this time. PCP: Marti Connelly MD    Current Facility-Administered Medications   Medication Dose Route Frequency Provider Last Rate Last Dose    morphine injection 4 mg  4 mg IntraVENous ONCE Lincoln Kirby MD         Current Outpatient Prescriptions   Medication Sig Dispense Refill    oxyCODONE-acetaminophen (PERCOCET) 5-325 mg per tablet Take 1 tablet every 4-6 hours as needed for pain control. If you were instructed to try over the counter ibuprofen or tylenol, only take the percocet for pain not controlled with the over the counter medication. 12 Tab 0    HYDROcodone-acetaminophen (NORCO) 5-325 mg per tablet Take 1-2 tablets PO every 4-6 hours as needed for pain control. If over the counter ibuprofen or acetaminophen was suggested, then only take the vicodin for pain not well controlled with the over the counter medication. 12 Tab 0    ondansetron hcl (ZOFRAN, AS HYDROCHLORIDE,) 4 mg tablet Take 2 Tabs by mouth every eight (8) hours as needed for Nausea.  12 Tab 0    acetaminophen-codeine (TYLENOL-CODEINE #3) 300-30 mg per tablet Take 1 Tab by mouth every four (4) hours as needed for Pain. Max Daily Amount: 6 Tabs. 15 Tab 0    omeprazole (PRILOSEC) 40 mg capsule Take 40 mg by mouth daily.  estradiol (ESTRACE) 2 mg tablet Take 2 mg by mouth every evening.  cyanocobalamin (VITAMIN B-12) 1,000 mcg sublingual tablet Take 1,000 mcg by mouth two (2) times a week.  FLUoxetine (PROZAC) 10 mg capsule Take 40 mg by mouth daily.  busPIRone (BUSPAR) 10 mg tablet Take 15 mg by mouth two (2) times a day.  lamoTRIgine (LAMICTAL XR) 50 mg tr24 ER tablet Take 100 mg by mouth every evening.  MULTIVITAMIN W/IRON, MINERALS (FLINTSTONES COMPLETE PO) Take  by mouth.  CALCIUM CITRATE PO Take 600 mg by mouth two (2) times a day. Past History     Past Medical History:  Past Medical History:   Diagnosis Date    Anxiety     Calculus of kidney     Depression     Gallbladder disease     GERD (gastroesophageal reflux disease)     H/O laparoscopic Malick-en-Y gastric bypass, 7/11/12, BMI 52     Hepatic steatosis     Hypertension     Malabsorption 7/20/2012       Past Surgical History:  Past Surgical History:   Procedure Laterality Date    HX CHOLECYSTECTOMY      HX GASTRIC BYPASS  7/11/12    Laparoscopic Malick-en-Y, BMI 52    HX HYSTERECTOMY  2014    HX ORTHOPAEDIC      ANKLE SURGERY    HX OTHER SURGICAL  7/11/12    Laparoscopic Left Hepatic Lobe Wedge Biopsy    HX TONSILLECTOMY      HX TUBAL LIGATION         Family History:  Family History   Problem Relation Age of Onset    Diabetes Mother     Obesity Mother     Cancer Mother      LEUKEMIA    Hypertension Mother     Colon Cancer Maternal Grandmother     Cancer Maternal [de-identified]     Obesity Sister     Hypertension Sister        Social History:  Social History   Substance Use Topics    Smoking status: Never Smoker    Smokeless tobacco: Never Used    Alcohol use No      Comment: occ       Allergies:   Allergies   Allergen Reactions    Nsaids (Non-Steroidal Anti-Inflammatory Drug) Other (comments)     Has had gastric bypass  GASTRIC BYPASS PT  Contraindicated by gastric bypass surgery    Tramadol Other (comments)     Causes Restless Legs         Review of Systems     Review of Systems   Constitutional: Negative for diaphoresis and fever. HENT: Negative for congestion and sore throat. Eyes: Negative for pain and itching. Respiratory: Negative for cough and shortness of breath. Cardiovascular: Negative for chest pain and palpitations. Gastrointestinal: Positive for abdominal pain (RLQ). Negative for diarrhea. Endocrine: Negative for polydipsia and polyuria. Genitourinary: Negative for dysuria and hematuria. Musculoskeletal: Negative for arthralgias and myalgias. Skin: Negative for rash and wound. Neurological: Negative for seizures and syncope. Hematological: Does not bruise/bleed easily. Psychiatric/Behavioral: Negative for agitation and hallucinations. Physical Exam     Visit Vitals    BP (!) 145/94 (BP 1 Location: Right arm, BP Patient Position: At rest;Sitting)    Pulse 80    Temp 97.8 °F (36.6 °C)    Resp 16    Ht 5' 2\" (1.575 m)    Wt 93 kg (205 lb)    LMP 10/28/2013    SpO2 97%    BMI 37.49 kg/m2       Physical Exam   Constitutional: She appears well-developed and well-nourished. She appears distressed (moderate). HENT:   Head: Normocephalic and atraumatic. Eyes: Conjunctivae are normal. No scleral icterus. Neck: Normal range of motion. Neck supple. No JVD present. Cardiovascular: Normal rate, regular rhythm and normal heart sounds. 4 intact extremity pulses   Pulmonary/Chest: Effort normal and breath sounds normal.   Abdominal: Soft. She exhibits no mass. There is tenderness (marked) in the right lower quadrant. Posterior psoas sign   Musculoskeletal: Normal range of motion. Lymphadenopathy:     She has no cervical adenopathy. Neurological: She is alert. Skin: Skin is warm and dry. Nursing note and vitals reviewed. Diagnostic Study Results     Labs -  Recent Results (from the past 12 hour(s))   URINALYSIS W/ RFLX MICROSCOPIC    Collection Time: 02/14/18  5:08 PM   Result Value Ref Range    Color YELLOW      Appearance CLEAR      Specific gravity 1.023 1.005 - 1.030      pH (UA) 5.0 5.0 - 8.0      Protein NEGATIVE  NEG mg/dL    Glucose NEGATIVE  NEG mg/dL    Ketone TRACE (A) NEG mg/dL    Bilirubin NEGATIVE  NEG      Blood MODERATE (A) NEG      Urobilinogen 0.2 0.2 - 1.0 EU/dL    Nitrites NEGATIVE  NEG      Leukocyte Esterase NEGATIVE  NEG     URINE MICROSCOPIC ONLY    Collection Time: 02/14/18  5:08 PM   Result Value Ref Range    WBC 0 to 3 0 - 4 /hpf    RBC 11 to 20 0 - 5 /hpf    Epithelial cells FEW 0 - 5 /lpf    Bacteria NEGATIVE  NEG /hpf   CBC WITH AUTOMATED DIFF    Collection Time: 02/14/18  5:51 PM   Result Value Ref Range    WBC 5.8 4.6 - 13.2 K/uL    RBC 4.73 4.20 - 5.30 M/uL    HGB 15.2 12.0 - 16.0 g/dL    HCT 45.3 (H) 35.0 - 45.0 %    MCV 95.8 74.0 - 97.0 FL    MCH 32.1 24.0 - 34.0 PG    MCHC 33.6 31.0 - 37.0 g/dL    RDW 12.2 11.6 - 14.5 %    PLATELET 937 819 - 147 K/uL    MPV 10.8 9.2 - 11.8 FL    NEUTROPHILS 56 40 - 73 %    LYMPHOCYTES 35 21 - 52 %    MONOCYTES 6 3 - 10 %    EOSINOPHILS 2 0 - 5 %    BASOPHILS 1 0 - 2 %    ABS. NEUTROPHILS 3.2 1.8 - 8.0 K/UL    ABS. LYMPHOCYTES 2.0 0.9 - 3.6 K/UL    ABS. MONOCYTES 0.4 0.05 - 1.2 K/UL    ABS. EOSINOPHILS 0.1 0.0 - 0.4 K/UL    ABS.  BASOPHILS 0.0 0.0 - 0.06 K/UL    DF AUTOMATED     METABOLIC PANEL, BASIC    Collection Time: 02/14/18  5:51 PM   Result Value Ref Range    Sodium 139 136 - 145 mmol/L    Potassium 3.9 3.5 - 5.5 mmol/L    Chloride 101 100 - 108 mmol/L    CO2 30 21 - 32 mmol/L    Anion gap 8 3.0 - 18 mmol/L    Glucose 97 74 - 99 mg/dL    BUN 10 7.0 - 18 MG/DL    Creatinine 0.88 0.6 - 1.3 MG/DL    BUN/Creatinine ratio 11 (L) 12 - 20      GFR est AA >60 >60 ml/min/1.73m2    GFR est non-AA >60 >60 ml/min/1.73m2 Calcium 9.1 8.5 - 10.1 MG/DL   HEPATIC FUNCTION PANEL    Collection Time: 02/14/18  5:51 PM   Result Value Ref Range    Protein, total 7.2 6.4 - 8.2 g/dL    Albumin 3.8 3.4 - 5.0 g/dL    Globulin 3.4 2.0 - 4.0 g/dL    A-G Ratio 1.1 0.8 - 1.7      Bilirubin, total 0.4 0.2 - 1.0 MG/DL    Bilirubin, direct 0.2 0.0 - 0.2 MG/DL    Alk. phosphatase 118 (H) 45 - 117 U/L    AST (SGOT) 18 15 - 37 U/L    ALT (SGPT) 22 13 - 56 U/L       Radiologic Studies -   CT ABD PELV WO CONT   Final Result        Ct Abd Pelv Wo Cont    Result Date: 2/14/2018  EXAM: CT of the abdomen and pelvis INDICATION: Right lower quadrant pain COMPARISON: July 22, 2017 TECHNIQUE: Axial CT imaging of the abdomen and pelvis was performed without intravenous contrast. Multiplanar reformats were generated. DOSE REDUCTION:  One or more dose reduction techniques were used on this CT: automated exposure control, adjustment of the mAs and/or kVp according to patient's size, and iterative reconstruction techniques. The specific techniques utilized on this CT exam have been documented in the patient's electronic medical record. _______________ FINDINGS: Initial read was given by resident on call LOWER CHEST: Unremarkable. LIVER, BILIARY: Liver is normal. No biliary dilation. Cholecystectomy PANCREAS: Normal. SPLEEN: Normal. ADRENALS: Normal. KIDNEYS/URETERS: 2 to 3 mm nonobstructive calculus seen in the lower pole the left kidney. Similar calculus seen in the midpole the right kidney. VASCULATURE: Unremarkable LYMPH NODES: No enlarged lymph nodes GASTRO INTESTINAL TRACT: Appendix is normal. There is no bowel obstruction. Nondilated fluid-filled small bowel loops seen in the upper abdomen. Post gastric bypass changes present. This is nonspecific. PELVIC ORGANS/BLADDER: Hysterectomy. The urinary bladder is under distended therefore difficult to evaluate. No free fluid. BONES: No acute or aggressive osseous abnormalities identified.  OTHER: None. _______________ IMPRESSION: 1. No acute appendicitis. Post gastric bypass changes present. Nonobstructive renal calculi bilaterally. No acute process       Medical Decision Making   Initial Medical Decision Making and DDx:  Appendicitis, bowel obstruction related to gastric bypass; less likely perforation, colitis, kidney   stone    ED Course: Progress Notes, Reevaluation, and Consults:  7:55 PM ct neg  Labs nonactionable. Pain better with morphine  Pain not c.w appy bowel obstruc perf colitis, ureteral lithiasis. Script for percocet, follow with pcp. Happy with plan and ready for discharge. Questions answered. I am the first provider for this patient. I reviewed the vital signs, available nursing notes, past medical history, past surgical history, family history and social history. Vital Signs-Reviewed the patient's vital signs. Diagnosis     Clinical Impression:   1. Abdominal pain, right lower quadrant        Disposition:     Follow-up Information     Follow up With Details Comments MD Lui In 2 days  [address] [place] [address]  [phone]             Patient's Medications   Start Taking    OXYCODONE-ACETAMINOPHEN (PERCOCET) 5-325 MG PER TABLET    Take 1 tablet every 4-6 hours as needed for pain control. If you were instructed to try over the counter ibuprofen or tylenol, only take the percocet for pain not controlled with the over the counter medication. Continue Taking    ACETAMINOPHEN-CODEINE (TYLENOL-CODEINE #3) 300-30 MG PER TABLET    Take 1 Tab by mouth every four (4) hours as needed for Pain. Max Daily Amount: 6 Tabs. BUSPIRONE (BUSPAR) 10 MG TABLET    Take 15 mg by mouth two (2) times a day. CALCIUM CITRATE PO    Take 600 mg by mouth two (2) times a day. CYANOCOBALAMIN (VITAMIN B-12) 1,000 MCG SUBLINGUAL TABLET    Take 1,000 mcg by mouth two (2) times a week. ESTRADIOL (ESTRACE) 2 MG TABLET    Take 2 mg by mouth every evening. FLUOXETINE (PROZAC) 10 MG CAPSULE    Take 40 mg by mouth daily. HYDROCODONE-ACETAMINOPHEN (NORCO) 5-325 MG PER TABLET    Take 1-2 tablets PO every 4-6 hours as needed for pain control. If over the counter ibuprofen or acetaminophen was suggested, then only take the vicodin for pain not well controlled with the over the counter medication. LAMOTRIGINE (LAMICTAL XR) 50 MG TR24 ER TABLET    Take 100 mg by mouth every evening. MULTIVITAMIN W/IRON, MINERALS (FLINTSTONES COMPLETE PO)    Take  by mouth. OMEPRAZOLE (PRILOSEC) 40 MG CAPSULE    Take 40 mg by mouth daily. ONDANSETRON HCL (ZOFRAN, AS HYDROCHLORIDE,) 4 MG TABLET    Take 2 Tabs by mouth every eight (8) hours as needed for Nausea. These Medications have changed    No medications on file   Stop Taking    OXYCODONE-ACETAMINOPHEN (PERCOCET) 5-325 MG PER TABLET    Take 1 Tab by mouth every four (4) hours as needed for Pain. Max Daily Amount: 6 Tabs. OXYCODONE-ACETAMINOPHEN (PERCOCET) 5-325 MG PER TABLET    Take 1 Tab by mouth every four (4) hours as needed for Pain. Max Daily Amount: 6 Tabs.     _______________________________    Attestations:  Brooke Rubysophic acting as a scribe for and in the presence of Wilmer Verduzco MD      February 14, 2018 at 6:50 PM       Provider Attestation:      I personally performed the services described in the documentation, reviewed the documentation, as recorded by the scribe in my presence, and it accurately and completely records my words and actions.  February 14, 2018 at 6:50 PM - Wilmer Verduzco MD    _______________________________

## 2018-02-15 NOTE — DISCHARGE INSTRUCTIONS
Abdominal Pain: Care Instructions  Your Care Instructions    Abdominal pain has many possible causes. Some aren't serious and get better on their own in a few days. Others need more testing and treatment. If your pain continues or gets worse, you need to be rechecked and may need more tests to find out what is wrong. You may need surgery to correct the problem. Don't ignore new symptoms, such as fever, nausea and vomiting, urination problems, pain that gets worse, and dizziness. These may be signs of a more serious problem. Your doctor may have recommended a follow-up visit in the next 8 to 12 hours. If you are not getting better, you may need more tests or treatment. The doctor has checked you carefully, but problems can develop later. If you notice any problems or new symptoms, get medical treatment right away. Follow-up care is a key part of your treatment and safety. Be sure to make and go to all appointments, and call your doctor if you are having problems. It's also a good idea to know your test results and keep a list of the medicines you take. How can you care for yourself at home? · Rest until you feel better. · To prevent dehydration, drink plenty of fluids, enough so that your urine is light yellow or clear like water. Choose water and other caffeine-free clear liquids until you feel better. If you have kidney, heart, or liver disease and have to limit fluids, talk with your doctor before you increase the amount of fluids you drink. · If your stomach is upset, eat mild foods, such as rice, dry toast or crackers, bananas, and applesauce. Try eating several small meals instead of two or three large ones. · Wait until 48 hours after all symptoms have gone away before you have spicy foods, alcohol, and drinks that contain caffeine. · Do not eat foods that are high in fat. · Avoid anti-inflammatory medicines such as aspirin, ibuprofen (Advil, Motrin), and naproxen (Aleve).  These can cause stomach upset. Talk to your doctor if you take daily aspirin for another health problem. When should you call for help? Call 911 anytime you think you may need emergency care. For example, call if:  ? · You passed out (lost consciousness). ? · You pass maroon or very bloody stools. ? · You vomit blood or what looks like coffee grounds. ? · You have new, severe belly pain. ?Call your doctor now or seek immediate medical care if:  ? · Your pain gets worse, especially if it becomes focused in one area of your belly. ? · You have a new or higher fever. ? · Your stools are black and look like tar, or they have streaks of blood. ? · You have unexpected vaginal bleeding. ? · You have symptoms of a urinary tract infection. These may include:  ¨ Pain when you urinate. ¨ Urinating more often than usual.  ¨ Blood in your urine. ? · You are dizzy or lightheaded, or you feel like you may faint. ? Watch closely for changes in your health, and be sure to contact your doctor if:  ? · You are not getting better after 1 day (24 hours). Where can you learn more? Go to http://booker-alessandra.info/. Enter X076 in the search box to learn more about \"Abdominal Pain: Care Instructions. \"  Current as of: March 20, 2017  Content Version: 11.4  © 0753-0269 [employer]. Care instructions adapted under license by [employer] (which disclaims liability or warranty for this information). If you have questions about a medical condition or this instruction, always ask your healthcare professional. [place] [address] any warranty or liability for your use of this information.

## 2018-05-03 ENCOUNTER — APPOINTMENT (OUTPATIENT)
Dept: CT IMAGING | Age: 43
End: 2018-05-03
Attending: NURSE PRACTITIONER
Payer: MEDICAID

## 2018-05-03 ENCOUNTER — HOSPITAL ENCOUNTER (EMERGENCY)
Age: 43
Discharge: HOME | End: 2018-05-03
Attending: EMERGENCY MEDICINE | Admitting: EMERGENCY MEDICINE
Payer: MEDICAID

## 2018-05-03 VITALS
OXYGEN SATURATION: 98 % | TEMPERATURE: 98.1 F | SYSTOLIC BLOOD PRESSURE: 136 MMHG | RESPIRATION RATE: 16 BRPM | HEIGHT: 62 IN | DIASTOLIC BLOOD PRESSURE: 95 MMHG | HEART RATE: 108 BPM | WEIGHT: 215 LBS | BODY MASS INDEX: 39.56 KG/M2

## 2018-05-03 DIAGNOSIS — R10.9 LEFT FLANK PAIN: Primary | ICD-10-CM

## 2018-05-03 DIAGNOSIS — R31.9 HEMATURIA, UNSPECIFIED TYPE: ICD-10-CM

## 2018-05-03 LAB
ALBUMIN SERPL-MCNC: 3.4 G/DL (ref 3.4–5)
ALBUMIN/GLOB SERPL: 1.1 {RATIO} (ref 0.8–1.7)
ALP SERPL-CCNC: 98 U/L (ref 45–117)
ALT SERPL-CCNC: 16 U/L (ref 13–56)
ANION GAP SERPL CALC-SCNC: 4 MMOL/L (ref 3–18)
APPEARANCE UR: ABNORMAL
AST SERPL-CCNC: 10 U/L (ref 15–37)
BACTERIA URNS QL MICRO: ABNORMAL /HPF
BASOPHILS # BLD: 0 K/UL (ref 0–0.06)
BASOPHILS NFR BLD: 1 % (ref 0–2)
BILIRUB SERPL-MCNC: 0.2 MG/DL (ref 0.2–1)
BILIRUB UR QL: NEGATIVE
BUN SERPL-MCNC: 16 MG/DL (ref 7–18)
BUN/CREAT SERPL: 21 (ref 12–20)
CALCIUM SERPL-MCNC: 8.1 MG/DL (ref 8.5–10.1)
CHLORIDE SERPL-SCNC: 107 MMOL/L (ref 100–108)
CO2 SERPL-SCNC: 29 MMOL/L (ref 21–32)
COLOR UR: ABNORMAL
CREAT SERPL-MCNC: 0.75 MG/DL (ref 0.6–1.3)
DIFFERENTIAL METHOD BLD: ABNORMAL
EOSINOPHIL # BLD: 0.1 K/UL (ref 0–0.4)
EOSINOPHIL NFR BLD: 2 % (ref 0–5)
EPITH CASTS URNS QL MICRO: ABNORMAL /LPF (ref 0–5)
ERYTHROCYTE [DISTWIDTH] IN BLOOD BY AUTOMATED COUNT: 13.3 % (ref 11.6–14.5)
GLOBULIN SER CALC-MCNC: 3.1 G/DL (ref 2–4)
GLUCOSE SERPL-MCNC: 78 MG/DL (ref 74–99)
GLUCOSE UR STRIP.AUTO-MCNC: NEGATIVE MG/DL
HCT VFR BLD AUTO: 38.7 % (ref 35–45)
HGB BLD-MCNC: 13.2 G/DL (ref 12–16)
HGB UR QL STRIP: ABNORMAL
KETONES UR QL STRIP.AUTO: NEGATIVE MG/DL
LEUKOCYTE ESTERASE UR QL STRIP.AUTO: ABNORMAL
LYMPHOCYTES # BLD: 1.7 K/UL (ref 0.9–3.6)
LYMPHOCYTES NFR BLD: 34 % (ref 21–52)
MCH RBC QN AUTO: 31.8 PG (ref 24–34)
MCHC RBC AUTO-ENTMCNC: 34.1 G/DL (ref 31–37)
MCV RBC AUTO: 93.3 FL (ref 74–97)
MONOCYTES # BLD: 0.3 K/UL (ref 0.05–1.2)
MONOCYTES NFR BLD: 7 % (ref 3–10)
NEUTS SEG # BLD: 2.8 K/UL (ref 1.8–8)
NEUTS SEG NFR BLD: 56 % (ref 40–73)
NITRITE UR QL STRIP.AUTO: NEGATIVE
PH UR STRIP: 5 [PH] (ref 5–8)
PLATELET # BLD AUTO: 204 K/UL (ref 135–420)
PMV BLD AUTO: 10.2 FL (ref 9.2–11.8)
POTASSIUM SERPL-SCNC: 3.9 MMOL/L (ref 3.5–5.5)
PROT SERPL-MCNC: 6.5 G/DL (ref 6.4–8.2)
PROT UR STRIP-MCNC: ABNORMAL MG/DL
RBC # BLD AUTO: 4.15 M/UL (ref 4.2–5.3)
RBC #/AREA URNS HPF: ABNORMAL /HPF (ref 0–5)
SODIUM SERPL-SCNC: 140 MMOL/L (ref 136–145)
SP GR UR REFRACTOMETRY: >1.03 (ref 1–1.03)
UROBILINOGEN UR QL STRIP.AUTO: 1 EU/DL (ref 0.2–1)
WBC # BLD AUTO: 4.9 K/UL (ref 4.6–13.2)
WBC URNS QL MICRO: ABNORMAL /HPF (ref 0–4)

## 2018-05-03 PROCEDURE — 85025 COMPLETE CBC W/AUTO DIFF WBC: CPT | Performed by: PHYSICIAN ASSISTANT

## 2018-05-03 PROCEDURE — 80053 COMPREHEN METABOLIC PANEL: CPT | Performed by: PHYSICIAN ASSISTANT

## 2018-05-03 PROCEDURE — 74177 CT ABD & PELVIS W/CONTRAST: CPT

## 2018-05-03 PROCEDURE — 74011636320 HC RX REV CODE- 636/320: Performed by: EMERGENCY MEDICINE

## 2018-05-03 PROCEDURE — 81001 URINALYSIS AUTO W/SCOPE: CPT | Performed by: PHYSICIAN ASSISTANT

## 2018-05-03 PROCEDURE — 74011250636 HC RX REV CODE- 250/636: Performed by: NURSE PRACTITIONER

## 2018-05-03 PROCEDURE — 96376 TX/PRO/DX INJ SAME DRUG ADON: CPT

## 2018-05-03 PROCEDURE — 96361 HYDRATE IV INFUSION ADD-ON: CPT

## 2018-05-03 PROCEDURE — 96374 THER/PROPH/DIAG INJ IV PUSH: CPT

## 2018-05-03 PROCEDURE — 99283 EMERGENCY DEPT VISIT LOW MDM: CPT

## 2018-05-03 RX ORDER — OXYCODONE AND ACETAMINOPHEN 5; 325 MG/1; MG/1
1 TABLET ORAL
Qty: 8 TAB | Refills: 0 | Status: SHIPPED | OUTPATIENT
Start: 2018-05-03

## 2018-05-03 RX ORDER — MORPHINE SULFATE 4 MG/ML
2 INJECTION INTRAVENOUS
Status: COMPLETED | OUTPATIENT
Start: 2018-05-03 | End: 2018-05-03

## 2018-05-03 RX ADMIN — MORPHINE SULFATE 2 MG: 4 INJECTION INTRAVENOUS at 13:19

## 2018-05-03 RX ADMIN — IOPAMIDOL 100 ML: 612 INJECTION, SOLUTION INTRAVENOUS at 13:59

## 2018-05-03 RX ADMIN — SODIUM CHLORIDE 1000 ML: 900 INJECTION, SOLUTION INTRAVENOUS at 13:19

## 2018-05-03 RX ADMIN — MORPHINE SULFATE 2 MG: 4 INJECTION INTRAVENOUS at 14:41

## 2018-05-03 NOTE — DISCHARGE INSTRUCTIONS

## 2018-05-03 NOTE — ED PROVIDER NOTES
EMERGENCY DEPARTMENT HISTORY AND PHYSICAL EXAM    Date: 5/3/2018  Patient Name: Dayna Lock    History of Presenting Illness     Chief Complaint   Patient presents with    Back Pain    Flank Pain         History Provided By: Patient    Chief Complaint: left flank pain that radiates down to her left groin  Duration: 3 Days  Timing:  Constant  Location: left flank that radiates to left groin  Quality: Stabbing  Severity: 8 out of 10  Modifying Factors: \"everything makes it worse. \" no known alleviating factors. Associated Symptoms: denies any other associated signs or symptoms      Additional History (Context): Dyana Lock is a 37 y.o. female with hypertension and depression, anxiety, gastric bypass, and kidney stone who presents with c/o left flank pain that radiates to left groin x3 days. Pt reports her left sided back pain started 2 weeks ago after falling while walking. She reports she thinks she landed on her back, she was not seen at that time. Pt reports now she has more left sided flank pain that radiates to her groin that is consistent with previous kidney stones. Pt denies hematuria. She also reports nausea and vomiting the last 2 days. She reports she has been able to void but has had to strain. Pt denies abdominal pain, pain radiating down leg, fever, dysuria, or any other symptoms or complaints. PCP: Lew Barfield MD    Current Outpatient Prescriptions   Medication Sig Dispense Refill    oxyCODONE-acetaminophen (PERCOCET) 5-325 mg per tablet Take 1 Tab by mouth every four (4) hours as needed for Pain for up to 8 doses. Max Daily Amount: 6 Tabs. 8 Tab 0    ondansetron hcl (ZOFRAN, AS HYDROCHLORIDE,) 4 mg tablet Take 2 Tabs by mouth every eight (8) hours as needed for Nausea. 12 Tab 0    acetaminophen-codeine (TYLENOL-CODEINE #3) 300-30 mg per tablet Take 1 Tab by mouth every four (4) hours as needed for Pain. Max Daily Amount: 6 Tabs.  15 Tab 0    omeprazole (PRILOSEC) 40 mg capsule Take 40 mg by mouth daily.  estradiol (ESTRACE) 2 mg tablet Take 2 mg by mouth every evening.  cyanocobalamin (VITAMIN B-12) 1,000 mcg sublingual tablet Take 1,000 mcg by mouth two (2) times a week.  FLUoxetine (PROZAC) 10 mg capsule Take 40 mg by mouth daily.  busPIRone (BUSPAR) 10 mg tablet Take 15 mg by mouth two (2) times a day.  lamoTRIgine (LAMICTAL XR) 50 mg tr24 ER tablet Take 100 mg by mouth every evening.  MULTIVITAMIN W/IRON, MINERALS (FLINTSTONES COMPLETE PO) Take  by mouth.  CALCIUM CITRATE PO Take 600 mg by mouth two (2) times a day. Past History     Past Medical History:  Past Medical History:   Diagnosis Date    Anxiety     Calculus of kidney     Depression     Gallbladder disease     GERD (gastroesophageal reflux disease)     H/O laparoscopic Malick-en-Y gastric bypass, 7/11/12, BMI 52     Hepatic steatosis     Hypertension     Malabsorption 7/20/2012       Past Surgical History:  Past Surgical History:   Procedure Laterality Date    HX CHOLECYSTECTOMY      HX GASTRIC BYPASS  7/11/12    Laparoscopic Malick-en-Y, BMI 52    HX HYSTERECTOMY  2014    HX ORTHOPAEDIC      ANKLE SURGERY    HX OTHER SURGICAL  7/11/12    Laparoscopic Left Hepatic Lobe Wedge Biopsy    HX TONSILLECTOMY      HX TUBAL LIGATION         Family History:  Family History   Problem Relation Age of Onset    Diabetes Mother     Obesity Mother     Cancer Mother      LEUKEMIA    Hypertension Mother     Colon Cancer Maternal Grandmother     Cancer Maternal [de-identified]     Obesity Sister     Hypertension Sister        Social History:  Social History   Substance Use Topics    Smoking status: Never Smoker    Smokeless tobacco: Never Used    Alcohol use No      Comment: occ       Allergies:   Allergies   Allergen Reactions    Nsaids (Non-Steroidal Anti-Inflammatory Drug) Other (comments)     Has had gastric bypass  GASTRIC BYPASS PT  Contraindicated by gastric bypass surgery    Tramadol Other (comments)     Causes Restless Legs         Review of Systems   Review of Systems   Constitutional: Negative for chills and fever. Respiratory: Negative for shortness of breath. Cardiovascular: Negative for chest pain. Gastrointestinal: Positive for nausea and vomiting. Negative for abdominal pain, constipation and diarrhea. Genitourinary: Positive for flank pain (left sided) and pelvic pain (left groin pain). Negative for dysuria, frequency and urgency. Musculoskeletal: Positive for back pain (left sided). All other systems reviewed and are negative. All Other Systems Negative  Physical Exam     Vitals:    05/03/18 1223   BP: (!) 136/95   Pulse: (!) 108   Resp: 16   Temp: 98.1 °F (36.7 °C)   SpO2: 98%   Weight: 97.5 kg (215 lb)   Height: 5' 2\" (1.575 m)     Physical Exam   Constitutional: She is oriented to person, place, and time. She appears well-developed and well-nourished. No distress. HENT:   Mouth/Throat: Oropharynx is clear and moist.   Eyes: Pupils are equal, round, and reactive to light. Neck: Normal range of motion. Cardiovascular: Normal rate, regular rhythm, normal heart sounds and intact distal pulses. Pulmonary/Chest: Effort normal and breath sounds normal. No respiratory distress. She has no wheezes. She has no rales. Abdominal: Soft. Bowel sounds are normal. She exhibits no distension. There is no tenderness. There is CVA tenderness (left sided). There is no rebound and no guarding. Musculoskeletal: Normal range of motion. Lymphadenopathy:     She has no cervical adenopathy. Neurological: She is alert and oriented to person, place, and time. Skin: Skin is warm and dry. She is not diaphoretic. Nursing note and vitals reviewed.            Diagnostic Study Results     Labs -     Recent Results (from the past 12 hour(s))   URINALYSIS W/ RFLX MICROSCOPIC    Collection Time: 05/03/18 12:32 PM   Result Value Ref Range    Color MARIS Appearance CLOUDY      Specific gravity >1.030 (H) 1.005 - 1.030    pH (UA) 5.0 5.0 - 8.0      Protein TRACE (A) NEG mg/dL    Glucose NEGATIVE  NEG mg/dL    Ketone NEGATIVE  NEG mg/dL    Bilirubin NEGATIVE  NEG      Blood LARGE (A) NEG      Urobilinogen 1.0 0.2 - 1.0 EU/dL    Nitrites NEGATIVE  NEG      Leukocyte Esterase TRACE (A) NEG     URINE MICROSCOPIC ONLY    Collection Time: 05/03/18 12:32 PM   Result Value Ref Range    WBC  0 - 4 /hpf     UNABLE TO QUANTITATE MICROSCOPIC PARAMETERS DUE TO EXCESSIVE RBCS    RBC TOO NUMEROUS TO COUNT 0 - 5 /hpf    Epithelial cells 1+ 0 - 5 /lpf    Bacteria 1+ (A) NEG /hpf   CBC WITH AUTOMATED DIFF    Collection Time: 05/03/18 12:52 PM   Result Value Ref Range    WBC 4.9 4.6 - 13.2 K/uL    RBC 4.15 (L) 4.20 - 5.30 M/uL    HGB 13.2 12.0 - 16.0 g/dL    HCT 38.7 35.0 - 45.0 %    MCV 93.3 74.0 - 97.0 FL    MCH 31.8 24.0 - 34.0 PG    MCHC 34.1 31.0 - 37.0 g/dL    RDW 13.3 11.6 - 14.5 %    PLATELET 766 943 - 678 K/uL    MPV 10.2 9.2 - 11.8 FL    NEUTROPHILS 56 40 - 73 %    LYMPHOCYTES 34 21 - 52 %    MONOCYTES 7 3 - 10 %    EOSINOPHILS 2 0 - 5 %    BASOPHILS 1 0 - 2 %    ABS. NEUTROPHILS 2.8 1.8 - 8.0 K/UL    ABS. LYMPHOCYTES 1.7 0.9 - 3.6 K/UL    ABS. MONOCYTES 0.3 0.05 - 1.2 K/UL    ABS. EOSINOPHILS 0.1 0.0 - 0.4 K/UL    ABS. BASOPHILS 0.0 0.0 - 0.06 K/UL    DF AUTOMATED     METABOLIC PANEL, COMPREHENSIVE    Collection Time: 05/03/18 12:52 PM   Result Value Ref Range    Sodium 140 136 - 145 mmol/L    Potassium 3.9 3.5 - 5.5 mmol/L    Chloride 107 100 - 108 mmol/L    CO2 29 21 - 32 mmol/L    Anion gap 4 3.0 - 18 mmol/L    Glucose 78 74 - 99 mg/dL    BUN 16 7.0 - 18 MG/DL    Creatinine 0.75 0.6 - 1.3 MG/DL    BUN/Creatinine ratio 21 (H) 12 - 20      GFR est AA >60 >60 ml/min/1.73m2    GFR est non-AA >60 >60 ml/min/1.73m2    Calcium 8.1 (L) 8.5 - 10.1 MG/DL    Bilirubin, total 0.2 0.2 - 1.0 MG/DL    ALT (SGPT) 16 13 - 56 U/L    AST (SGOT) 10 (L) 15 - 37 U/L    Alk.  phosphatase 98 45 - 117 U/L    Protein, total 6.5 6.4 - 8.2 g/dL    Albumin 3.4 3.4 - 5.0 g/dL    Globulin 3.1 2.0 - 4.0 g/dL    A-G Ratio 1.1 0.8 - 1.7         Radiologic Studies -   CT ABD PELV W CONT   Final Result        CT Results  (Last 48 hours)               05/03/18 1358  CT ABD PELV W CONT Final result    Impression:  IMPRESSION:        CT findings indicating a cause for left flank pain are not identified       No acute intra-abdominal or pelvic abnormalities. All CT scans at this facility are performed using dose optimization technique as   appropriate to the performed exam, to include automated exposure control,   adjustment of the mA and/or kV according to patient's size (Including   appropriate matching for site-specific examinations), or use of iterative   reconstruction technique. Narrative:  CT ABDOMEN AND PELVIS WITH CONTRAST       COMPARISON: February 14, 2018. INDICATIONS: Left flank pain. TECHNIQUE:  Following the uneventful administration of 100cc of Isovue 300   intravenous contrast , volumetric data acquisition was performed of the abdomen   and pelvis on a multislice scanner and reconstructed in axial coronal and   sagittal planes       CT ABDOMEN FINDINGS:        Lung Bases: Clear. Liver/Gallbladder/Biliary: Gallbladder surgically absent. Otherwise negative. Spleen: Normal.       Adrenal Glands: Normal.       Kidneys: A punctate nonobstructive right intrarenal calculus is noted. No   definite left-sided calculi are seen. No ureteral calculus is evident. .       Pancreas: Normal.       Stomach, Small Bowel,  and Colon: Previous gastric bypass. Stomach otherwise   unremarkable. Small bowel is unremarkable. . The appendix is normal. The colon   is unremarkable. Lymph Nodes: Normal in size and number. The abdominal aorta is unremarkable. The IVC is unremarkable. Peritoneal Spaces: No free fluid or free air is present.        Abdominal wall: No hernia or mass is evident       CT PELVIS FINDINGS:        Bladder: Unremarkable. Pelvic Small Bowel And Colon: Infrequent diverticuli are noted without findings   of diverticulitis. Lymph Nodes: Normal in size and number. Free Fluid: Not present. Uterus is surgically absent. Osseous Structures Of Abdomen And Pelvis: Unremarkable for age. CXR Results  (Last 48 hours)    None            Medical Decision Making   I am the first provider for this patient. I reviewed the vital signs, available nursing notes, past medical history, past surgical history, family history and social history. Vital Signs-Reviewed the patient's vital signs. Pulse Oximetry Analysis - 98% on room air      Records Reviewed: Nursing Notes    Procedures:  Procedures    Provider Notes (Medical Decision Making):     DDX: left sided back pain with sciatica, kidney stone, pyelonephritis, or other process. 36 YO F presents to ED C/O left sided flank pain that radiates to left groin x3 days. Pt reports left sided back pain x2 weeks after a fall but reports her left sided flank pain that radiates to her left groin started 3 days ago with associated nausea and vomiting. Pt denies hematuria, dysuria, or inability to void. She reports hesitency. Denies fever, chills, or abdominal pain. Pt is afebrile here, tachycardiac though on exam HR is WDL. On exam, she does have left sided CVA tenderness. Will order UA, CMP, and CBC. Will give pain medication, she has tolerated morphine in the past.    1:31 PM: UA positive for blood. Will order CT of abdomen since pain has lasted longer than 24 hours. 3:11 PM: CT negative for left sided renal calculus. Possible that patient passed stone. Will d/c with percocet for pain and have her follow up with her PCP. Return to emergency department for change or worsening symptoms or concerns.      MED RECONCILIATION:  No current facility-administered medications for this encounter. Current Outpatient Prescriptions   Medication Sig    oxyCODONE-acetaminophen (PERCOCET) 5-325 mg per tablet Take 1 Tab by mouth every four (4) hours as needed for Pain for up to 8 doses. Max Daily Amount: 6 Tabs.  ondansetron hcl (ZOFRAN, AS HYDROCHLORIDE,) 4 mg tablet Take 2 Tabs by mouth every eight (8) hours as needed for Nausea.  acetaminophen-codeine (TYLENOL-CODEINE #3) 300-30 mg per tablet Take 1 Tab by mouth every four (4) hours as needed for Pain. Max Daily Amount: 6 Tabs.  omeprazole (PRILOSEC) 40 mg capsule Take 40 mg by mouth daily.  estradiol (ESTRACE) 2 mg tablet Take 2 mg by mouth every evening.  cyanocobalamin (VITAMIN B-12) 1,000 mcg sublingual tablet Take 1,000 mcg by mouth two (2) times a week.  FLUoxetine (PROZAC) 10 mg capsule Take 40 mg by mouth daily.  busPIRone (BUSPAR) 10 mg tablet Take 15 mg by mouth two (2) times a day.  lamoTRIgine (LAMICTAL XR) 50 mg tr24 ER tablet Take 100 mg by mouth every evening.  MULTIVITAMIN W/IRON, MINERALS (FLINTSTONES COMPLETE PO) Take  by mouth.  CALCIUM CITRATE PO Take 600 mg by mouth two (2) times a day. Disposition: d/c home    DISCHARGE NOTE:   Pt has been reexamined. Patient has no new complaints, changes, or physical findings. Care plan outlined and precautions discussed. Results of labs, UA, and CT were reviewed with the patient. All medications were reviewed with the patient; will d/c home with percocet. All of pt's questions and concerns were addressed. Patient was instructed and agrees to follow up with PCP, as well as to return to the ED upon further deterioration. Patient is ready to go home. Follow-up Information     Follow up With Details Comments Mimi Fortune MD Call today for follow up appointment.  [id number] [facility] [address]      [facility] EMERGENCY DEPT  As needed, If symptoms worsen [address] [place] [address] [address] [facility] Current Discharge Medication List      CONTINUE these medications which have CHANGED    Details   oxyCODONE-acetaminophen (PERCOCET) 5-325 mg per tablet Take 1 Tab by mouth every four (4) hours as needed for Pain for up to 8 doses. Max Daily Amount: 6 Tabs. Qty: 8 Tab, Refills: 0    Associated Diagnoses: Left flank pain         STOP taking these medications       HYDROcodone-acetaminophen (NORCO) 5-325 mg per tablet Comments:   Reason for Stopping:                 Diagnosis     Clinical Impression:   1. Left flank pain    2.  Hematuria, unspecified type

## 2018-05-03 NOTE — ED TRIAGE NOTES
Patient states that about 2 weeks ago she feel and hurt her back. Now she is also complaining of left flank pain. Thinks she has a kidney stone that may be trying to pass.

## 2018-05-03 NOTE — ED TRIAGE NOTES
Pt reports back pain since falling 2 weeks ago. Also reports left flank and left lower abdominal pain x 2 days with vomiting. Denies nausea at present. No urinary sx. H/o kidney stones. I performed a brief evaluation, including history and physical, of the patient here in triage and I have determined that pt will need further treatment and evaluation from the main side ER physician. I have placed initial orders to help in expediting patients care.      May 03, 2018 at 12:24 PM - Nova Martinez PA-C        Visit Vitals    BP (!) 136/95 (BP 1 Location: Left arm, BP Patient Position: At rest;Sitting)    Pulse (!) 108    Temp 98.1 °F (36.7 °C)    Resp 16    Ht 5' 2\" (1.575 m)    Wt 97.5 kg (215 lb)    SpO2 98%    BMI 39.32 kg/m2

## 2018-06-23 ENCOUNTER — HOSPITAL ENCOUNTER (EMERGENCY)
Age: 43
Discharge: HOME | End: 2018-06-23
Attending: EMERGENCY MEDICINE
Payer: MEDICAID

## 2018-06-23 VITALS
SYSTOLIC BLOOD PRESSURE: 129 MMHG | RESPIRATION RATE: 18 BRPM | HEART RATE: 72 BPM | DIASTOLIC BLOOD PRESSURE: 93 MMHG | TEMPERATURE: 98.8 F | OXYGEN SATURATION: 99 %

## 2018-06-23 DIAGNOSIS — R13.19 ESOPHAGEAL DYSPHAGIA: ICD-10-CM

## 2018-06-23 DIAGNOSIS — R10.9 LEFT FLANK PAIN: Primary | ICD-10-CM

## 2018-06-23 LAB
ALBUMIN SERPL-MCNC: 3.6 G/DL (ref 3.4–5)
ALBUMIN/GLOB SERPL: 1 {RATIO} (ref 0.8–1.7)
ALP SERPL-CCNC: 101 U/L (ref 45–117)
ALT SERPL-CCNC: 19 U/L (ref 13–56)
ANION GAP SERPL CALC-SCNC: 5 MMOL/L (ref 3–18)
APPEARANCE UR: ABNORMAL
AST SERPL-CCNC: 14 U/L (ref 15–37)
BACTERIA URNS QL MICRO: ABNORMAL /HPF
BASOPHILS # BLD: 0 K/UL (ref 0–0.06)
BASOPHILS NFR BLD: 0 % (ref 0–2)
BILIRUB SERPL-MCNC: 0.3 MG/DL (ref 0.2–1)
BILIRUB UR QL: NEGATIVE
BUN SERPL-MCNC: 12 MG/DL (ref 7–18)
BUN/CREAT SERPL: 13 (ref 12–20)
CALCIUM SERPL-MCNC: 8.5 MG/DL (ref 8.5–10.1)
CHLORIDE SERPL-SCNC: 105 MMOL/L (ref 100–108)
CO2 SERPL-SCNC: 30 MMOL/L (ref 21–32)
COLOR UR: YELLOW
CREAT SERPL-MCNC: 0.89 MG/DL (ref 0.6–1.3)
DIFFERENTIAL METHOD BLD: ABNORMAL
EOSINOPHIL # BLD: 0.1 K/UL (ref 0–0.4)
EOSINOPHIL NFR BLD: 2 % (ref 0–5)
EPITH CASTS URNS QL MICRO: ABNORMAL /LPF (ref 0–5)
ERYTHROCYTE [DISTWIDTH] IN BLOOD BY AUTOMATED COUNT: 13.6 % (ref 11.6–14.5)
GLOBULIN SER CALC-MCNC: 3.6 G/DL (ref 2–4)
GLUCOSE SERPL-MCNC: 89 MG/DL (ref 74–99)
GLUCOSE UR STRIP.AUTO-MCNC: NEGATIVE MG/DL
HCT VFR BLD AUTO: 43.3 % (ref 35–45)
HGB BLD-MCNC: 14.3 G/DL (ref 12–16)
HGB UR QL STRIP: ABNORMAL
KETONES UR QL STRIP.AUTO: NEGATIVE MG/DL
LEUKOCYTE ESTERASE UR QL STRIP.AUTO: NEGATIVE
LIPASE SERPL-CCNC: 186 U/L (ref 73–393)
LYMPHOCYTES # BLD: 1.3 K/UL (ref 0.9–3.6)
LYMPHOCYTES NFR BLD: 20 % (ref 21–52)
MCH RBC QN AUTO: 32.1 PG (ref 24–34)
MCHC RBC AUTO-ENTMCNC: 33 G/DL (ref 31–37)
MCV RBC AUTO: 97.1 FL (ref 74–97)
MONOCYTES # BLD: 0.4 K/UL (ref 0.05–1.2)
MONOCYTES NFR BLD: 7 % (ref 3–10)
NEUTS SEG # BLD: 4.4 K/UL (ref 1.8–8)
NEUTS SEG NFR BLD: 71 % (ref 40–73)
NITRITE UR QL STRIP.AUTO: NEGATIVE
PH UR STRIP: 5 [PH] (ref 5–8)
PLATELET # BLD AUTO: 211 K/UL (ref 135–420)
PMV BLD AUTO: 9.9 FL (ref 9.2–11.8)
POTASSIUM SERPL-SCNC: 3.8 MMOL/L (ref 3.5–5.5)
PROT SERPL-MCNC: 7.2 G/DL (ref 6.4–8.2)
PROT UR STRIP-MCNC: NEGATIVE MG/DL
RBC # BLD AUTO: 4.46 M/UL (ref 4.2–5.3)
RBC #/AREA URNS HPF: ABNORMAL /HPF (ref 0–5)
SODIUM SERPL-SCNC: 140 MMOL/L (ref 136–145)
SP GR UR REFRACTOMETRY: 1.03 (ref 1–1.03)
UROBILINOGEN UR QL STRIP.AUTO: 1 EU/DL (ref 0.2–1)
WBC # BLD AUTO: 6.3 K/UL (ref 4.6–13.2)
WBC URNS QL MICRO: ABNORMAL /HPF (ref 0–5)

## 2018-06-23 PROCEDURE — 87086 URINE CULTURE/COLONY COUNT: CPT | Performed by: EMERGENCY MEDICINE

## 2018-06-23 PROCEDURE — 74011250637 HC RX REV CODE- 250/637: Performed by: EMERGENCY MEDICINE

## 2018-06-23 PROCEDURE — 85025 COMPLETE CBC W/AUTO DIFF WBC: CPT | Performed by: EMERGENCY MEDICINE

## 2018-06-23 PROCEDURE — 80053 COMPREHEN METABOLIC PANEL: CPT | Performed by: EMERGENCY MEDICINE

## 2018-06-23 PROCEDURE — 81001 URINALYSIS AUTO W/SCOPE: CPT | Performed by: EMERGENCY MEDICINE

## 2018-06-23 PROCEDURE — 83690 ASSAY OF LIPASE: CPT | Performed by: EMERGENCY MEDICINE

## 2018-06-23 PROCEDURE — 99283 EMERGENCY DEPT VISIT LOW MDM: CPT

## 2018-06-23 RX ORDER — OXYCODONE AND ACETAMINOPHEN 5; 325 MG/1; MG/1
1 TABLET ORAL
Status: COMPLETED | OUTPATIENT
Start: 2018-06-23 | End: 2018-06-23

## 2018-06-23 RX ORDER — ACETAMINOPHEN 500MG/15ML
500 LIQUID (ML) ORAL
Qty: 237 ML | Refills: 0 | Status: SHIPPED | OUTPATIENT
Start: 2018-06-23

## 2018-06-23 RX ADMIN — OXYCODONE HYDROCHLORIDE AND ACETAMINOPHEN 1 TABLET: 5; 325 TABLET ORAL at 11:52

## 2018-06-23 NOTE — ED PROVIDER NOTES
EMERGENCY DEPARTMENT HISTORY AND PHYSICAL EXAM    10:16 AM      Date: 6/23/2018  Patient Name: Hellen Gonsales    History of Presenting Illness     Chief Complaint   Patient presents with    Abdominal Pain    Kidney Stone         History Provided By: Patient    Additional History (Context): Hellen Gonsales is a 37 y.o. female with PMHx of HTN, gastric bypass, calculus of kidney, gallbladder disease, anxiety, depression, and GERD who presents with c/o moderate constant aching and radiating flank pain that started 2 weeks ago. Pt states that the pain radiates to her lower back. Pt has a hx of flank pain but states that it usually doesn't last long but this time it is constant. Pt also has hx of kidney stones and states that her flank pain feels like she is passing stones. Pt has a hx of gastric bypass in 2012 by Dr. Jair Cordoba and it was done her at [facility] per the pt. Associated Sx include appetite change (3 days), hiatal hernia pain, and lower back pain. Pt states that she tries to eat, she feels as though the food gets stuck in the middle of her chest. Denies any further complaints or symptoms at the moment. PCP: Celine Reece MD    Chief Complaint: Flank pain  Duration: 2 Weeks  Timing:  Constant  Location: right flank  Quality: Aching and radiating   Severity: Moderate  Modifying Factors: None   Associated Symptoms: appetite change and hiatal hernia pain and lower back pain      Current Outpatient Prescriptions   Medication Sig Dispense Refill    oxyCODONE-acetaminophen (PERCOCET) 5-325 mg per tablet Take 1 Tab by mouth every four (4) hours as needed for Pain for up to 8 doses. Max Daily Amount: 6 Tabs. 8 Tab 0    ondansetron hcl (ZOFRAN, AS HYDROCHLORIDE,) 4 mg tablet Take 2 Tabs by mouth every eight (8) hours as needed for Nausea. 12 Tab 0    acetaminophen-codeine (TYLENOL-CODEINE #3) 300-30 mg per tablet Take 1 Tab by mouth every four (4) hours as needed for Pain. Max Daily Amount: 6 Tabs.  15 Tab 0  omeprazole (PRILOSEC) 40 mg capsule Take 40 mg by mouth daily.  estradiol (ESTRACE) 2 mg tablet Take 2 mg by mouth every evening.  cyanocobalamin (VITAMIN B-12) 1,000 mcg sublingual tablet Take 1,000 mcg by mouth two (2) times a week.  FLUoxetine (PROZAC) 10 mg capsule Take 40 mg by mouth daily.  busPIRone (BUSPAR) 10 mg tablet Take 15 mg by mouth two (2) times a day.  lamoTRIgine (LAMICTAL XR) 50 mg tr24 ER tablet Take 100 mg by mouth every evening.  MULTIVITAMIN W/IRON, MINERALS (FLINTSTONES COMPLETE PO) Take  by mouth.  CALCIUM CITRATE PO Take 600 mg by mouth two (2) times a day. Past History     Past Medical History:  Past Medical History:   Diagnosis Date    Anxiety     Calculus of kidney     Depression     Gallbladder disease     GERD (gastroesophageal reflux disease)     H/O laparoscopic Malick-en-Y gastric bypass, 7/11/12, BMI 52     Hepatic steatosis     Hypertension     Malabsorption 7/20/2012       Past Surgical History:  Past Surgical History:   Procedure Laterality Date    HX CHOLECYSTECTOMY      HX GASTRIC BYPASS  7/11/12    Laparoscopic Malick-en-Y, BMI 52    HX HYSTERECTOMY  2014    HX ORTHOPAEDIC      ANKLE SURGERY    HX OTHER SURGICAL  7/11/12    Laparoscopic Left Hepatic Lobe Wedge Biopsy    HX TONSILLECTOMY      HX TUBAL LIGATION         Family History:  Family History   Problem Relation Age of Onset    Diabetes Mother     Obesity Mother     Cancer Mother      LEUKEMIA    Hypertension Mother     Colon Cancer Maternal Grandmother     Cancer Maternal [de-identified]     Obesity Sister     Hypertension Sister        Social History:  Social History   Substance Use Topics    Smoking status: Never Smoker    Smokeless tobacco: Never Used    Alcohol use No      Comment: occ       Allergies:   Allergies   Allergen Reactions    Nsaids (Non-Steroidal Anti-Inflammatory Drug) Other (comments)     Has had gastric bypass  GASTRIC BYPASS PT  Contraindicated by gastric bypass surgery    Tramadol Other (comments)     Causes Restless Legs         Review of Systems     Review of Systems   Constitutional: Positive for appetite change. Gastrointestinal:        Positive for hernia pain   Genitourinary: Positive for flank pain (right). Musculoskeletal: Positive for back pain (lower). All other systems reviewed and are negative. Physical Exam     Visit Vitals    BP (!) 129/93    Pulse 72    Temp 98.8 °F (37.1 °C)    Resp 18    LMP 10/28/2013    SpO2 99%       Physical Exam   Constitutional: She is oriented to person, place, and time. She appears well-developed. HENT:   Head: Normocephalic and atraumatic. Eyes: EOM are normal. Pupils are equal, round, and reactive to light. Neck: Normal range of motion. Neck supple. Cardiovascular: Normal rate, regular rhythm and normal heart sounds. Exam reveals no friction rub. No murmur heard. Pulmonary/Chest: Effort normal and breath sounds normal. No respiratory distress. She has no wheezes. Abdominal: Soft. She exhibits no distension. There is no tenderness. There is no rebound and no guarding. Musculoskeletal: Normal range of motion. Neurological: She is alert and oriented to person, place, and time. Skin: Skin is warm and dry. Psychiatric: She has a normal mood and affect. Her behavior is normal. Thought content normal.         Diagnostic Study Results         Medical Decision Making     1. Pain with swallowing. -refer to GI; hx of gastric bipass. 2. Flank pain : had ct with similar UA last time taht was neg. Will not repeat. Allergies to nsaids and tramadol. Diagnosis     No diagnosis found.     _______________________________    Attestations:  Brooke Dumont 89 acting as a scribe for and in the presence of Iftikhar Ross MD      June 23, 2018 at 10:16 AM       Provider Attestation:      I personally performed the services described in the documentation, reviewed the documentation, as recorded by the scribe in my presence, and it accurately and completely records my words and actions.  June 23, 2018 at 10:16 AM - Jl Purvis MD    _______________________________

## 2018-06-23 NOTE — ED TRIAGE NOTES
Pt complaining of abdominal pain related to hernia for two days. Pt states she also thinks she is passing a kidney stone.

## 2018-06-23 NOTE — DISCHARGE INSTRUCTIONS
Abdominal Pain: Care Instructions  Your Care Instructions    Abdominal pain has many possible causes. Some aren't serious and get better on their own in a few days. Others need more testing and treatment. If your pain continues or gets worse, you need to be rechecked and may need more tests to find out what is wrong. You may need surgery to correct the problem. Don't ignore new symptoms, such as fever, nausea and vomiting, urination problems, pain that gets worse, and dizziness. These may be signs of a more serious problem. Your doctor may have recommended a follow-up visit in the next 8 to 12 hours. If you are not getting better, you may need more tests or treatment. The doctor has checked you carefully, but problems can develop later. If you notice any problems or new symptoms, get medical treatment right away. Follow-up care is a key part of your treatment and safety. Be sure to make and go to all appointments, and call your doctor if you are having problems. It's also a good idea to know your test results and keep a list of the medicines you take. How can you care for yourself at home? · Rest until you feel better. · To prevent dehydration, drink plenty of fluids, enough so that your urine is light yellow or clear like water. Choose water and other caffeine-free clear liquids until you feel better. If you have kidney, heart, or liver disease and have to limit fluids, talk with your doctor before you increase the amount of fluids you drink. · If your stomach is upset, eat mild foods, such as rice, dry toast or crackers, bananas, and applesauce. Try eating several small meals instead of two or three large ones. · Wait until 48 hours after all symptoms have gone away before you have spicy foods, alcohol, and drinks that contain caffeine. · Do not eat foods that are high in fat. · Avoid anti-inflammatory medicines such as aspirin, ibuprofen (Advil, Motrin), and naproxen (Aleve).  These can cause stomach upset. Talk to your doctor if you take daily aspirin for another health problem. When should you call for help? Call 911 anytime you think you may need emergency care. For example, call if:  ? · You passed out (lost consciousness). ? · You pass maroon or very bloody stools. ? · You vomit blood or what looks like coffee grounds. ? · You have new, severe belly pain. ?Call your doctor now or seek immediate medical care if:  ? · Your pain gets worse, especially if it becomes focused in one area of your belly. ? · You have a new or higher fever. ? · Your stools are black and look like tar, or they have streaks of blood. ? · You have unexpected vaginal bleeding. ? · You have symptoms of a urinary tract infection. These may include:  ¨ Pain when you urinate. ¨ Urinating more often than usual.  ¨ Blood in your urine. ? · You are dizzy or lightheaded, or you feel like you may faint. ? Watch closely for changes in your health, and be sure to contact your doctor if:  ? · You are not getting better after 1 day (24 hours). Where can you learn more? Go to http://booker-alessandra.info/. Enter H704 in the search box to learn more about \"Abdominal Pain: Care Instructions. \"  Current as of: March 20, 2017  Content Version: 11.4  © 2195-5607 [employer]. Care instructions adapted under license by [employer] (which disclaims liability or warranty for this information). If you have questions about a medical condition or this instruction, always ask your healthcare professional. [place] [address] any warranty or liability for your use of this information.

## 2018-06-23 NOTE — ED NOTES
Pt c/o L flank pain, and hiatal hernia. Pt has hx of hysterectomy, oophorectomy, gall bladder removal, gastric bypass. Pt states that she feels \"like Im getting backed up when I eat up here (pointing to throat). \"

## 2018-06-24 LAB
BACTERIA SPEC CULT: NORMAL
SERVICE CMNT-IMP: NORMAL

## 2019-02-17 ENCOUNTER — APPOINTMENT (OUTPATIENT)
Dept: GENERAL RADIOLOGY | Age: 44
End: 2019-02-17
Attending: PHYSICIAN ASSISTANT
Payer: MEDICAID

## 2019-02-17 ENCOUNTER — HOSPITAL ENCOUNTER (EMERGENCY)
Age: 44
Discharge: HOME | End: 2019-02-17
Attending: EMERGENCY MEDICINE
Payer: MEDICAID

## 2019-02-17 VITALS
OXYGEN SATURATION: 100 % | HEART RATE: 82 BPM | RESPIRATION RATE: 16 BRPM | DIASTOLIC BLOOD PRESSURE: 101 MMHG | TEMPERATURE: 97.9 F | SYSTOLIC BLOOD PRESSURE: 145 MMHG

## 2019-02-17 DIAGNOSIS — S83.92XA SPRAIN OF LEFT KNEE, UNSPECIFIED LIGAMENT, INITIAL ENCOUNTER: ICD-10-CM

## 2019-02-17 DIAGNOSIS — S93.401A SPRAIN OF RIGHT ANKLE, UNSPECIFIED LIGAMENT, INITIAL ENCOUNTER: Primary | ICD-10-CM

## 2019-02-17 PROCEDURE — 73564 X-RAY EXAM KNEE 4 OR MORE: CPT

## 2019-02-17 PROCEDURE — 74011250637 HC RX REV CODE- 250/637: Performed by: PHYSICIAN ASSISTANT

## 2019-02-17 PROCEDURE — 99283 EMERGENCY DEPT VISIT LOW MDM: CPT

## 2019-02-17 PROCEDURE — 73610 X-RAY EXAM OF ANKLE: CPT

## 2019-02-17 RX ORDER — ACETAMINOPHEN AND CODEINE PHOSPHATE 300; 30 MG/1; MG/1
1 TABLET ORAL
Qty: 10 TAB | Refills: 0 | Status: SHIPPED | OUTPATIENT
Start: 2019-02-17

## 2019-02-17 RX ORDER — ACETAMINOPHEN 500 MG
1000 TABLET ORAL
Status: COMPLETED | OUTPATIENT
Start: 2019-02-17 | End: 2019-02-17

## 2019-02-17 RX ADMIN — ACETAMINOPHEN 1000 MG: 500 TABLET ORAL at 14:45
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
## 2019-02-17 NOTE — DISCHARGE INSTRUCTIONS
Patient Education        Ankle Sprain: Care Instructions  Your Care Instructions    An ankle sprain can happen when you twist your ankle. The ligaments that support the ankle can get stretched and torn. Often the ankle is swollen and painful. Ankle sprains may take from several weeks to several months to heal. Usually, the more pain and swelling you have, the more severe your ankle sprain is and the longer it will take to heal. You can heal faster and regain strength in your ankle with good home treatment. It is very important to give your ankle time to heal completely, so that you do not easily hurt your ankle again. Follow-up care is a key part of your treatment and safety. Be sure to make and go to all appointments, and call your doctor if you are having problems. It's also a good idea to know your test results and keep a list of the medicines you take. How can you care for yourself at home? · Prop up your foot on pillows as much as possible for the next 3 days. Try to keep your ankle above the level of your heart. This will help reduce the swelling. · Follow your doctor's directions for wearing a splint or elastic bandage. Wrapping the ankle may help reduce or prevent swelling. · Your doctor may give you a splint, a brace, an air stirrup, or another form of ankle support to protect your ankle until it is healed. Wear it as directed while your ankle is healing. Do not remove it unless your doctor tells you to. After your ankle has healed, ask your doctor whether you should wear the brace when you exercise. · Put ice or cold packs on your injured ankle for 10 to 20 minutes at a time. Try to do this every 1 to 2 hours for the next 3 days (when you are awake) or until the swelling goes down. Put a thin cloth between the ice and your skin. · You may need to use crutches until you can walk without pain. If you do use crutches, try to bear some weight on your injured ankle if you can do so without pain.  This helps the ankle heal.  · Take pain medicines exactly as directed. ? If the doctor gave you a prescription medicine for pain, take it as prescribed. ? If you are not taking a prescription pain medicine, ask your doctor if you can take an over-the-counter medicine. · If you have been given ankle exercises to do at home, do them exactly as instructed. These can promote healing and help prevent lasting weakness. When should you call for help? Call your doctor now or seek immediate medical care if:    · Your pain is getting worse.     · Your swelling is getting worse.     · Your splint feels too tight or you are unable to loosen it.    Watch closely for changes in your health, and be sure to contact your doctor if:    · You are not getting better after 1 week. Where can you learn more? Go to http://booker-alessandra.info/. Enter N716 in the search box to learn more about \"Ankle Sprain: Care Instructions. \"  Current as of: September 20, 2018  Content Version: 11.9  © 9582-2364 [employer]. Care instructions adapted under license by [employer] (which disclaims liability or warranty for this information). If you have questions about a medical condition or this instruction, always ask your healthcare professional. [place] [address] any warranty or liability for your use of this information. Patient Education        Knee Sprain: Care Instructions  Your Care Instructions    A knee sprain is one or more stretched, partly torn, or completely torn knee ligaments. Ligaments are bands of ropelike tissue that connect bone to bone and make the knee stable. The knee has four main ligaments. Knee sprains often happen because of a twisting or bending injury from sports such as skiing, basketball, soccer, or football. The knee turns one way while the lower or upper leg goes another way. A sprain also can happen when the knee is hit from the side or the front.   If a knee ligament is slightly stretched, you will probably need only home treatment. You may need a splint or brace (immobilizer) for a partly torn ligament. A complete tear may need surgery. A minor knee sprain may take up to 6 weeks to heal, while a severe sprain may take months. Follow-up care is a key part of your treatment and safety. Be sure to make and go to all appointments, and call your doctor if you are having problems. It's also a good idea to know your test results and keep a list of the medicines you take. How can you care for yourself at home? · Follow instructions about how much weight you can put on your leg and how to walk with crutches. · Prop up your leg on a pillow when you ice it or anytime you sit or lie down for the next 3 days. Try to keep it above the level of your heart. This will help reduce swelling. · Put ice or a cold pack on your knee for 10 to 20 minutes at a time. Try to do this every 1 to 2 hours for the next 3 days (when you are awake) or until the swelling goes down. Put a thin cloth between the ice and your skin. Do not get the splint wet. · If you have an elastic bandage, make sure it is snug but not so tight that your leg is numb, tingles, or swells below the bandage. You can loosen the bandage if it is too tight. · Your doctor may recommend a brace (immobilizer) to support your knee while it heals. Wear it as directed. · Ask your doctor if you can take an over-the-counter pain medicine, such as acetaminophen (Tylenol), ibuprofen (Advil, Motrin), or naproxen (Aleve). Be safe with medicines. Read and follow all instructions on the label. When should you call for help? Call 911 anytime you think you may need emergency care.  For example, call if:    · You have sudden chest pain and shortness of breath, or you cough up blood.    Call your doctor now or seek immediate medical care if:    · You have increased or severe pain.     · You cannot move your toes or ankle.     · Your foot is cool or pale or changes color.     · You have tingling, weakness, or numbness in your foot or leg.     · Your splint or brace feels too tight.     · You are unable to straighten the knee, or the knee \"locks. \"     · You have signs of a blood clot in your leg, such as:  ? Pain in your calf, back of the knee, thigh, or groin. ? Redness and swelling in your leg.    Watch closely for changes in your health, and be sure to contact your doctor if:    · Your pain is not getting better or is getting worse. Where can you learn more? Go to http://booker-alessandra.info/. Enter N406 in the search box to learn more about \"Knee Sprain: Care Instructions. \"  Current as of: September 20, 2018  Content Version: 11.9  © 6293-6094 [employer]. Care instructions adapted under license by [employer] (which disclaims liability or warranty for this information). If you have questions about a medical condition or this instruction, always ask your healthcare professional. [address] any warranty or liability for your use of this information.
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
## 2019-02-17 NOTE — ED NOTES
Pt discharged per MD order. Pt verbalized understanding of discharge instructions and follow up care. Opportunity provided to ask questions. Prescription education given. Ace wrap applied. Pt ambulated independently out of ED.

## 2019-05-17 ENCOUNTER — IMPORTED ENCOUNTER (OUTPATIENT)
Dept: URBAN - NONMETROPOLITAN AREA CLINIC 1 | Facility: CLINIC | Age: 44
End: 2019-05-17

## 2019-05-17 PROCEDURE — S0620 ROUTINE OPHTHALMOLOGICAL EXA: HCPCS

## 2019-05-17 NOTE — PATIENT DISCUSSION
Astigmatism-Discussed diagnosis with patient. Updated spec Rx given. Recommend lens that will provide comfort as well as protect safety and health of eyes. Glaucoma Suspect-Based on STARTED ON G[facility]S PER [facility] GTTSRTC 1MON TA CHECK TO DETERMINE IF GLC IS PRESENT

## 2019-06-25 ENCOUNTER — IMPORTED ENCOUNTER (OUTPATIENT)
Dept: URBAN - NONMETROPOLITAN AREA CLINIC 1 | Facility: CLINIC | Age: 44
End: 2019-06-25

## 2019-06-25 PROCEDURE — 92012 INTRM OPH EXAM EST PATIENT: CPT

## 2019-06-25 NOTE — PATIENT DISCUSSION
Astigmatism-Discussed diagnosis with patient. Updated spec Rx given. Recommend lens that will provide comfort as well as protect safety and health of eyes. Glaucoma Suspect-Based on STARTED ON GTTS PER [facility] BY DR Sivan Roy GTTS ON FUTURE VISITS

## 2020-08-26 ENCOUNTER — IMPORTED ENCOUNTER (OUTPATIENT)
Dept: URBAN - NONMETROPOLITAN AREA CLINIC 1 | Facility: CLINIC | Age: 45
End: 2020-08-26

## 2020-08-26 PROCEDURE — S0621 ROUTINE OPHTHALMOLOGICAL EXA: HCPCS

## 2020-08-26 NOTE — PATIENT DISCUSSION
Astigmatism-Discussed diagnosis with patient. Updated spec Rx given. Recommend lens that will provide comfort as well as protect safety and health of eyes. Glaucoma Suspect-Based on STARTED ON GTTS PER [facility] BY DR Law Peaks GTTS ON FUTURE VISITS

## 2021-06-10 NOTE — PATIENT DISCUSSION
DRY EYE SYNDROME OU: EDUCATED PATIENT ON FINDINGS. PRESCRIBE CL-SAFE ARTIFICIAL TEARS BID/PRN OU. ADVISED TO RTC IF SI/SX PERSIST OR WORSEN. MONITOR.

## 2021-06-10 NOTE — PATIENT DISCUSSION
MYOPIA OU: UPDATED SRX/CL RX RELEASED TO PATIENT. CL TRIALS OF NEW RX ORDERED FOR PATIENT TO TRY BEFORE ORDERING SUPPLY. DISCUSSED PROPER WEAR/CARE/HYGIENE OF CLS. RTC IF DISCOMFORT. MONITOR.

## 2021-07-08 ENCOUNTER — PREPPED CHART (OUTPATIENT)
Dept: RURAL CLINIC 1 | Facility: CLINIC | Age: 46
End: 2021-07-08

## 2021-07-08 ENCOUNTER — IMPORTED ENCOUNTER (OUTPATIENT)
Dept: URBAN - NONMETROPOLITAN AREA CLINIC 1 | Facility: CLINIC | Age: 46
End: 2021-07-08

## 2021-07-08 PROBLEM — H52.4: Noted: 2021-07-08

## 2021-07-08 PROBLEM — H52.223: Noted: 2021-07-08

## 2021-07-08 PROBLEM — H40.013: Noted: 2021-07-08

## 2021-07-08 PROBLEM — H55.09: Noted: 2021-07-08

## 2021-07-08 PROCEDURE — 92014 COMPRE OPH EXAM EST PT 1/>: CPT

## 2021-07-08 NOTE — PATIENT DISCUSSION
Glaucoma Suspect-Based on STARTED ON GTTS PER [facility] BY DR Juan C Carbajal GTTS ON FUTURE VISITSeducate pt on need for vfmild rotational nystagmuseducate ptconsder pcp referral

## 2022-04-05 ASSESSMENT — TONOMETRY
OS_IOP_MMHG: 18
OD_IOP_MMHG: 18

## 2022-04-06 ENCOUNTER — ESTABLISHED PATIENT (OUTPATIENT)
Dept: RURAL CLINIC 1 | Facility: CLINIC | Age: 47
End: 2022-04-06

## 2022-04-06 DIAGNOSIS — E11.9: ICD-10-CM

## 2022-04-06 DIAGNOSIS — H55.09: ICD-10-CM

## 2022-04-06 PROCEDURE — 99214 OFFICE O/P EST MOD 30 MIN: CPT

## 2022-04-06 ASSESSMENT — TONOMETRY
OS_IOP_MMHG: 15
OD_IOP_MMHG: 15

## 2022-04-06 ASSESSMENT — VISUAL ACUITY
OD_SC: 20/30-1
OS_SC: 20/50+2
OU_SC: J3
OS_PH: 20/30

## 2022-04-06 NOTE — PATIENT DISCUSSION
No active Diabetic Retinopathy is present on examination. recommend +1.00 OTC readers for some of her distance

## 2022-04-15 ASSESSMENT — VISUAL ACUITY
OD_CC: 20/40+
OS_CC: 20/30
OD_CC: 20/30
OS_CC: J1+
OD_CC: J1+
OS_CC: 20/40+
OD_SC: 20/30+
OS_SC: 20/25+

## 2022-04-15 ASSESSMENT — TONOMETRY
OD_IOP_MMHG: 18
OD_IOP_MMHG: 14
OS_IOP_MMHG: 18
OS_IOP_MMHG: 18
OD_IOP_MMHG: 18
OS_IOP_MMHG: 18
OS_IOP_MMHG: 16
OD_IOP_MMHG: 18

## 2024-08-20 ENCOUNTER — COMPREHENSIVE EXAM (OUTPATIENT)
Dept: RURAL CLINIC 1 | Facility: CLINIC | Age: 49
End: 2024-08-20

## 2024-08-20 DIAGNOSIS — E11.9: ICD-10-CM

## 2024-08-20 DIAGNOSIS — H52.223: ICD-10-CM

## 2024-08-20 DIAGNOSIS — H55.09: ICD-10-CM

## 2024-08-20 DIAGNOSIS — H52.4: ICD-10-CM

## 2024-08-20 PROCEDURE — S0621AEC ROUTINE OPH EXAM INCLUDES REF/ EST PATIENT

## 2024-08-20 ASSESSMENT — VISUAL ACUITY
OU_SC: 20/100
OS_SC: 20/30-1
OD_SC: 20/100
OD_SC: 20/30-1
OS_SC: 20/100
OU_SC: 20/25-2

## 2024-08-20 ASSESSMENT — TONOMETRY
OD_IOP_MMHG: 12
OS_IOP_MMHG: 12